# Patient Record
Sex: MALE | Race: WHITE | Employment: OTHER | URBAN - METROPOLITAN AREA
[De-identification: names, ages, dates, MRNs, and addresses within clinical notes are randomized per-mention and may not be internally consistent; named-entity substitution may affect disease eponyms.]

---

## 2017-02-07 ENCOUNTER — OFFICE VISIT (OUTPATIENT)
Dept: ORTHOPEDIC SURGERY | Age: 62
End: 2017-02-07

## 2017-02-07 VITALS
BODY MASS INDEX: 32.51 KG/M2 | OXYGEN SATURATION: 96 % | HEIGHT: 72 IN | HEART RATE: 80 BPM | DIASTOLIC BLOOD PRESSURE: 75 MMHG | RESPIRATION RATE: 18 BRPM | SYSTOLIC BLOOD PRESSURE: 111 MMHG | TEMPERATURE: 98.2 F | WEIGHT: 240 LBS

## 2017-02-07 DIAGNOSIS — M54.5 LOW BACK PAIN, UNSPECIFIED BACK PAIN LATERALITY, UNSPECIFIED CHRONICITY, WITH SCIATICA PRESENCE UNSPECIFIED: Primary | ICD-10-CM

## 2017-02-07 DIAGNOSIS — Z98.1 S/P LUMBAR FUSION: ICD-10-CM

## 2017-02-07 DIAGNOSIS — M53.3 PAIN OF BOTH SACROILIAC JOINTS: ICD-10-CM

## 2017-02-07 DIAGNOSIS — M47.816 LUMBAR FACET ARTHROPATHY: ICD-10-CM

## 2017-02-07 DIAGNOSIS — M62.830 MUSCLE SPASM OF BACK: ICD-10-CM

## 2017-02-07 RX ORDER — OXYCODONE AND ACETAMINOPHEN 10; 325 MG/1; MG/1
1 TABLET ORAL
COMMUNITY
Start: 2017-01-12 | End: 2020-11-04

## 2017-02-07 RX ORDER — FLUTICASONE PROPIONATE 50 MCG
SPRAY, SUSPENSION (ML) NASAL
Refills: 12 | COMMUNITY
Start: 2016-12-16

## 2017-02-07 RX ORDER — FLUOXETINE HYDROCHLORIDE 20 MG/1
20 CAPSULE ORAL DAILY
Refills: 3 | COMMUNITY
Start: 2017-01-19

## 2017-02-07 RX ORDER — OMEPRAZOLE 20 MG/1
20 CAPSULE, DELAYED RELEASE ORAL DAILY
Refills: 8 | COMMUNITY
Start: 2017-01-22

## 2017-02-07 RX ORDER — BUPROPION HYDROCHLORIDE 300 MG/1
300 TABLET ORAL
COMMUNITY
Start: 2017-01-18 | End: 2020-11-04

## 2017-02-07 RX ORDER — ERYTHROMYCIN 250 MG/1
CAPSULE, DELAYED RELEASE ORAL
Refills: 0 | COMMUNITY
Start: 2016-11-23 | End: 2017-02-07 | Stop reason: ALTCHOICE

## 2017-02-07 RX ORDER — BENZONATATE 100 MG/1
CAPSULE ORAL
COMMUNITY
Start: 2017-01-12 | End: 2017-02-07 | Stop reason: ALTCHOICE

## 2017-02-07 RX ORDER — AZELASTINE 1 MG/ML
2 SPRAY, METERED NASAL
Refills: 3 | COMMUNITY
Start: 2017-01-14

## 2017-02-07 RX ORDER — PRAZOSIN HYDROCHLORIDE 2 MG/1
CAPSULE ORAL
Refills: 11 | COMMUNITY
Start: 2017-01-19 | End: 2020-11-04

## 2017-02-07 RX ORDER — FLUNISOLIDE 0.25 MG/ML
SOLUTION NASAL
Refills: 3 | COMMUNITY
Start: 2017-01-14 | End: 2018-11-29 | Stop reason: ALTCHOICE

## 2017-02-07 RX ORDER — CHLORTHALIDONE 25 MG/1
TABLET ORAL
Refills: 2 | COMMUNITY
Start: 2017-01-06 | End: 2017-02-07 | Stop reason: ALTCHOICE

## 2017-02-07 NOTE — PATIENT INSTRUCTIONS
Low Back Arthritis: Exercises  Your Care Instructions  Here are some examples of typical rehabilitation exercises for your condition. Start each exercise slowly. Ease off the exercise if you start to have pain. Your doctor or physical therapist will tell you when you can start these exercises and which ones will work best for you. When you are not being active, find a comfortable position for rest. Some people are comfortable on the floor or a medium-firm bed with a small pillow under their head and another under their knees. Some people prefer to lie on their side with a pillow between their knees. Don't stay in one position for too long. Take short walks (10 to 20 minutes) every 2 to 3 hours. Avoid slopes, hills, and stairs until you feel better. Walk only distances you can manage without pain, especially leg pain. How to do the exercises  Pelvic tilt    1. Lie on your back with your knees bent. 2. \"Brace\" your stomach--tighten your muscles by pulling in and imagining your belly button moving toward your spine. 3. Press your lower back into the floor. You should feel your hips and pelvis rock back. 4. Hold for 6 seconds while breathing smoothly. 5. Relax and allow your pelvis and hips to rock forward. 6. Repeat 8 to 12 times. Back stretches    1. Get down on your hands and knees on the floor. 2. Relax your head and allow it to droop. Round your back up toward the ceiling until you feel a nice stretch in your upper, middle, and lower back. Hold this stretch for as long as it feels comfortable, or about 15 to 30 seconds. 3. Return to the starting position with a flat back while you are on your hands and knees. 4. Let your back sway by pressing your stomach toward the floor. Lift your buttocks toward the ceiling. 5. Hold this position for 15 to 30 seconds. 6. Repeat 2 to 4 times. Follow-up care is a key part of your treatment and safety.  Be sure to make and go to all appointments, and call your doctor if you are having problems. It's also a good idea to know your test results and keep a list of the medicines you take. Where can you learn more? Go to http://sue-gia.info/. Enter B055 in the search box to learn more about \"Low Back Arthritis: Exercises. \"  Current as of: May 23, 2016  Content Version: 11.1  © 5167-2878 [employer]. Care instructions adapted under license by [employer] (which disclaims liability or warranty for this information). If you have questions about a medical condition or this instruction, always ask your healthcare professional. [place] [address] any warranty or liability for your use of this information. Sacroiliac Pain: Exercises  Your Care Instructions  Here are some examples of typical rehabilitation exercises for your condition. Start each exercise slowly. Ease off the exercise if you start to have pain. Your doctor or physical therapist will tell you when you can start these exercises and which ones will work best for you. How to do the exercises  Knee-to-chest stretch    Do not do the knee-to-chest exercise if it causes or increases back or leg pain. 7. Lie on your back with your knees bent and your feet flat on the floor. You can put a small pillow under your head and neck if it is more comfortable. 8. Grasp your hands under one knee and bring the knee to your chest, keeping the other foot flat on the floor. 9. Keep your lower back pressed to the floor. Hold for at least 15 to 30 seconds. 10. Relax and lower the knee to the starting position. Repeat with the other leg. 11. Repeat 2 to 4 times with each leg. 12. To get more stretch, keep your other leg flat on the floor while pulling your knee to your chest.  Bridging    7. Lie on your back with both knees bent. Your knees should be bent about 90 degrees. 8. Tighten your belly muscles by pulling in your belly button toward your spine.  Then push your feet into the floor, squeeze your buttocks, and lift your hips off the floor until your shoulders, hips, and knees are all in a straight line. 9. Hold for about 6 seconds as you continue to breathe normally, and then slowly lower your hips back down to the floor and rest for up to 10 seconds. 10. Repeat 8 to 12 times. Hip extension    1. Get down on your hands and knees on the floor. 2. Keeping your back and neck straight, lift one leg straight out behind you. When you lift your leg, keep your hips level. Don't let your back twist, and don't let your hip drop toward the floor. 3. Hold for 6 seconds. Repeat 8 to 12 times with each leg. 4. If you feel steady and strong when you do this exercise, you can make it more difficult. To do this, when you lift your leg, also lift the opposite arm straight out in front of you. For example, lift the left leg and the right arm at the same time. (This is sometimes called the \"bird dog exercise. \") Hold for 6 seconds, and repeat 8 to 12 times on each side. Clamshell    1. Lie on your side with a pillow under your head. Keep your feet and knees together and your knees bent. 2. Raise your top knee, but keep your feet together. Do not let your hips roll back. Your legs should open up like a clamshell. 3. Hold for 6 seconds. 4. Slowly lower your knee back down. Rest for 10 seconds. 5. Repeat 8 to 12 times. 6. Switch to your other side and repeat steps 1 through 5. Hamstring wall stretch    1. Lie on your back in a doorway, with one leg through the open door. 2. Slide your affected leg up the wall to straighten your knee. You should feel a gentle stretch down the back of your leg. ¨ Do not arch your back. ¨ Do not bend either knee. ¨ Keep one heel touching the floor and the other heel touching the wall. Do not point your toes. 3. Hold the stretch for at least 1 minute to begin. Then try to lengthen the time you hold the stretch to as long as 6 minutes.   4. Switch legs, and repeat steps 1 through 3.  5. Repeat 2 to 4 times. If you do not have a place to do this exercise in a doorway, there is another way to do it:  1. Lie on your back, and bend one knee. 2. Loop a towel under the ball and toes of that foot, and hold the ends of the towel in your hands. 3. Straighten your knee, and slowly pull back on the towel. You should feel a gentle stretch down the back of your leg. 4. Switch legs, and repeat steps 1 through 3.  5. Repeat 2 to 4 times. Lower abdominal strengthening    1. Lie on your back with your knees bent and your feet flat on the floor. 2. Tighten your belly muscles by pulling your belly button in toward your spine. 3. Lift one foot off the floor and bring your knee toward your chest, so that your knee is straight above your hip and your leg is bent like the letter \"L. \"  4. Lift the other knee up to the same position. 5. Lower one leg at a time to the starting position. 6. Keep alternating legs until you have lifted each leg 8 to 12 times. 7. Be sure to keep your belly muscles tight and your back still as you are moving your legs. Be sure to breathe normally. Piriformis stretch    1. Lie on your back with your legs straight. 2. Lift your affected leg, and bend your knee. With your opposite hand, reach across your body, and then gently pull your knee toward your opposite shoulder. 3. Hold the stretch for 15 to 30 seconds. 4. Switch legs and repeat steps 1 through 3.  5. Repeat 2 to 4 times. Follow-up care is a key part of your treatment and safety. Be sure to make and go to all appointments, and call your doctor if you are having problems. It's also a good idea to know your test results and keep a list of the medicines you take. Where can you learn more? Go to http://sue-gia.info/. Enter W160 in the search box to learn more about \"Sacroiliac Pain: Exercises. \"  Current as of: May 23, 2016  Content Version: 11.1  © 9956-0499 [employer]. Care instructions adapted under license by [employer] (which disclaims liability or warranty for this information). If you have questions about a medical condition or this instruction, always ask your healthcare professional. [address] any warranty or liability for your use of this information.

## 2017-02-07 NOTE — MR AVS SNAPSHOT
Visit Information Date & Time Provider Department Dept. Phone Encounter #  
 2/7/2017  9:00 AM Meme Freed, [address] [facility] [phone] [id number] Follow-up Instructions Return if symptoms worsen or fail to improve. Upcoming Health Maintenance Date Due Hepatitis C Screening 1955 DTaP/Tdap/Td series (1 - Tdap) 3/1/1977 FOBT Q 1 YEAR AGE 50-75 3/1/2006 ZOSTER VACCINE AGE 60> 3/1/2016 INFLUENZA AGE 9 TO ADULT 8/1/2016 Allergies as of 2/7/2017  Never Reviewed Severity Noted Reaction Type Reactions Chlorthalidone  02/07/2017    Other (comments) Low Blood Pressure Effexor [Venlafaxine]  02/07/2017    Other (comments) High Blood Pressure Keflex [Cephalexin]  02/07/2017    Itching Current Immunizations  Never Reviewed No immunizations on file. Not reviewed this visit You Were Diagnosed With   
  
 Codes Comments Low back pain, unspecified back pain laterality, unspecified chronicity, with sciatica presence unspecified    -  Primary ICD-10-CM: M54.5 ICD-9-CM: 724.2 Lumbar facet arthropathy (HCC)     ICD-10-CM: M12.88 ICD-9-CM: 721.3 S/P lumbar fusion     ICD-10-CM: Z98.1 ICD-9-CM: V45.4 Muscle spasm of back     ICD-10-CM: F02.549 ICD-9-CM: 724.8 Pain of both sacroiliac joints     ICD-10-CM: M53.3 ICD-9-CM: 724.6 Vitals BP Pulse Temp Resp Height(growth percentile) Weight(growth percentile) 111/75 80 98.2 °F (36.8 °C) (Oral) 18 6' (1.829 m) 240 lb (108.9 kg) SpO2 BMI Smoking Status 96% 32.55 kg/m2 Former Smoker BMI and BSA Data Body Mass Index Body Surface Area 32.55 kg/m 2 2.35 m 2 Preferred Pharmacy Pharmacy Name Phone [facility] [address] [phone] Your Updated Medication List  
  
   
 This list is accurate as of: 2/7/17 10:00 AM.  Always use your most recent med list.  
  
  
  
  
 azelastine 137 mcg (0.1 %) nasal spray Commonly known as:  ASTELIN  
INT 2 SPRAYS IEN BID buPROPion  mg XL tablet Commonly known as:  Kehinde Grossman Take 300 mg by mouth every morning. flunisolide 25 mcg (0.025 %) Spry Commonly known as:  NASAREL  
INT 2 SPRAYS IEN BID FLUoxetine 20 mg capsule Commonly known as:  PROzac TK 1 C PO TID  
  
 fluticasone 50 mcg/actuation nasal spray Commonly known as:  Abhay Metro SHAKE LQ AND U 2 SPRAYS IEN QD  
  
 omeprazole 20 mg capsule Commonly known as:  PRILOSEC TK 1 C PO BID  
  
 oxyCODONE-acetaminophen  mg per tablet Commonly known as:  PERCOCET 10 Take 1 Tab by mouth every six (6) hours as needed. prazosin 2 mg capsule Commonly known as:  MINIPRESS TK 1 TO 2 CS PO QHS FOR TRAUMATIC DREAMS We Performed the Following AMB POC XRAY, SPINE, LUMBOSACRAL; 4+ [id number] CPT(R)] Follow-up Instructions Return if symptoms worsen or fail to improve. Patient Instructions Low Back Arthritis: Exercises Your Care Instructions Here are some examples of typical rehabilitation exercises for your condition. Start each exercise slowly. Ease off the exercise if you start to have pain. Your doctor or physical therapist will tell you when you can start these exercises and which ones will work best for you. When you are not being active, find a comfortable position for rest. Some people are comfortable on the floor or a medium-firm bed with a small pillow under their head and another under their knees. Some people prefer to lie on their side with a pillow between their knees. Don't stay in one position for too long. Take short walks (10 to 20 minutes) every 2 to 3 hours. Avoid slopes, hills, and stairs until you feel better. Walk only distances you can manage without pain, especially leg pain. How to do the exercises Pelvic tilt 1. Lie on your back with your knees bent. 2. \"Brace\" your stomachtighten your muscles by pulling in and imagining your belly button moving toward your spine. 3. Press your lower back into the floor. You should feel your hips and pelvis rock back. 4. Hold for 6 seconds while breathing smoothly. 5. Relax and allow your pelvis and hips to rock forward. 6. Repeat 8 to 12 times. Back stretches 1. Get down on your hands and knees on the floor. 2. Relax your head and allow it to droop. Round your back up toward the ceiling until you feel a nice stretch in your upper, middle, and lower back. Hold this stretch for as long as it feels comfortable, or about 15 to 30 seconds. 3. Return to the starting position with a flat back while you are on your hands and knees. 4. Let your back sway by pressing your stomach toward the floor. Lift your buttocks toward the ceiling. 5. Hold this position for 15 to 30 seconds. 6. Repeat 2 to 4 times. Follow-up care is a key part of your treatment and safety. Be sure to make and go to all appointments, and call your doctor if you are having problems. It's also a good idea to know your test results and keep a list of the medicines you take. Where can you learn more? Go to http://sue-gia.info/. Enter T251 in the search box to learn more about \"Low Back Arthritis: Exercises. \" Current as of: May 23, 2016 Content Version: 11.1 © 20060739-8517 [employer]. Care instructions adapted under license by [employer] (which disclaims liability or warranty for this information). If you have questions about a medical condition or this instruction, always ask your healthcare professional. [address] any warranty or liability for your use of this information. Sacroiliac Pain: Exercises Your Care Instructions Here are some examples of typical rehabilitation exercises for your condition. Start each exercise slowly. Ease off the exercise if you start to have pain. Your doctor or physical therapist will tell you when you can start these exercises and which ones will work best for you. How to do the exercises Knee-to-chest stretch Do not do the knee-to-chest exercise if it causes or increases back or leg pain. 7. Lie on your back with your knees bent and your feet flat on the floor. You can put a small pillow under your head and neck if it is more comfortable. 8. Grasp your hands under one knee and bring the knee to your chest, keeping the other foot flat on the floor. 9. Keep your lower back pressed to the floor. Hold for at least 15 to 30 seconds. 10. Relax and lower the knee to the starting position. Repeat with the other leg. 11. Repeat 2 to 4 times with each leg. 12. To get more stretch, keep your other leg flat on the floor while pulling your knee to your chest. 
Bridging 7. Lie on your back with both knees bent. Your knees should be bent about 90 degrees. 8. Tighten your belly muscles by pulling in your belly button toward your spine. Then push your feet into the floor, squeeze your buttocks, and lift your hips off the floor until your shoulders, hips, and knees are all in a straight line. 9. Hold for about 6 seconds as you continue to breathe normally, and then slowly lower your hips back down to the floor and rest for up to 10 seconds. 10. Repeat 8 to 12 times. Hip extension 1. Get down on your hands and knees on the floor. 2. Keeping your back and neck straight, lift one leg straight out behind you. When you lift your leg, keep your hips level. Don't let your back twist, and don't let your hip drop toward the floor. 3. Hold for 6 seconds. Repeat 8 to 12 times with each leg. 4. If you feel steady and strong when you do this exercise, you can make it more difficult.  To do this, when you lift your leg, also lift the opposite arm straight out in front of you. For example, lift the left leg and the right arm at the same time. (This is sometimes called the \"bird dog exercise. \") Hold for 6 seconds, and repeat 8 to 12 times on each side. Clamshell 1. Lie on your side with a pillow under your head. Keep your feet and knees together and your knees bent. 2. Raise your top knee, but keep your feet together. Do not let your hips roll back. Your legs should open up like a clamshell. 3. Hold for 6 seconds. 4. Slowly lower your knee back down. Rest for 10 seconds. 5. Repeat 8 to 12 times. 6. Switch to your other side and repeat steps 1 through 5. Hamstring wall stretch 1. Lie on your back in a doorway, with one leg through the open door. 2. Slide your affected leg up the wall to straighten your knee. You should feel a gentle stretch down the back of your leg. ¨ Do not arch your back. ¨ Do not bend either knee. ¨ Keep one heel touching the floor and the other heel touching the wall. Do not point your toes. 3. Hold the stretch for at least 1 minute to begin. Then try to lengthen the time you hold the stretch to as long as 6 minutes. 4. Switch legs, and repeat steps 1 through 3. 
5. Repeat 2 to 4 times. If you do not have a place to do this exercise in a doorway, there is another way to do it: 1. Lie on your back, and bend one knee. 2. Loop a towel under the ball and toes of that foot, and hold the ends of the towel in your hands. 3. Straighten your knee, and slowly pull back on the towel. You should feel a gentle stretch down the back of your leg. 4. Switch legs, and repeat steps 1 through 3. 
5. Repeat 2 to 4 times. Lower abdominal strengthening 1. Lie on your back with your knees bent and your feet flat on the floor. 2. Tighten your belly muscles by pulling your belly button in toward your spine.  
3. Lift one foot off the floor and bring your knee toward your chest, so that your knee is straight above your hip and your leg is bent like the letter \"L. \" 
4. Lift the other knee up to the same position. 5. Lower one leg at a time to the starting position. 6. Keep alternating legs until you have lifted each leg 8 to 12 times. 7. Be sure to keep your belly muscles tight and your back still as you are moving your legs. Be sure to breathe normally. Piriformis stretch 1. Lie on your back with your legs straight. 2. Lift your affected leg, and bend your knee. With your opposite hand, reach across your body, and then gently pull your knee toward your opposite shoulder. 3. Hold the stretch for 15 to 30 seconds. 4. Switch legs and repeat steps 1 through 3. 
5. Repeat 2 to 4 times. Follow-up care is a key part of your treatment and safety. Be sure to make and go to all appointments, and call your doctor if you are having problems. It's also a good idea to know your test results and keep a list of the medicines you take. Where can you learn more? Go to http://sue-gia.info/. Enter N161 in the search box to learn more about \"Sacroiliac Pain: Exercises. \" Current as of: May 23, 2016 Content Version: 11.1 © 2621-1112 [employer]. Care instructions adapted under license by [employer] (which disclaims liability or warranty for this information). If you have questions about a medical condition or this instruction, always ask your healthcare professional. [place] [address] any warranty or liability for your use of this information. Introducing [facility]! [facility] introduces [employer] patient portal. Now you can access parts of your medical record, email your doctor's office, and request medication refills online. 1. In your internet browser, go to [email or web address] 2. Click on the First Time User? Click Here link in the Sign In box. You will see the New Member Sign Up page. 3. Enter your Dialective Access Code exactly as it appears below. You will not need to use this code after youve completed the sign-up process. If you do not sign up before the expiration date, you must request a new code. · Dialective Access Code: [email or web address] Expires: 5/8/2017 10:00 AM 
 
4. Enter the last four digits of your Social Security Number (xxxx) and Date of Birth (mm/dd/yyyy) as indicated and click Submit. You will be taken to the next sign-up page. 5. Create a Dialective ID. This will be your Dialective login ID and cannot be changed, so think of one that is secure and easy to remember. 6. Create a Dialective password. You can change your password at any time. 7. Enter your Password Reset Question and Answer. This can be used at a later time if you forget your password. 8. Enter your e-mail address. You will receive e-mail notification when new information is available in [address]. 9. Click Sign Up. You can now view and download portions of your medical record. 10. Click the Download Summary menu link to download a portable copy of your medical information. If you have questions, please visit the Frequently Asked Questions section of the Dialective website. Remember, Dialective is NOT to be used for urgent needs. For medical emergencies, dial 911. Now available from your iPhone and Android! Please provide this summary of care documentation to your next provider. Your primary care clinician is listed as [address]. If you have any questions after today's visit, please call [phone].

## 2017-02-07 NOTE — PROGRESS NOTES
[facility]  [address] [place], [facility] [address]  Phone: [phone]  Fax: [phone]          HISTORY OF PRESENT ILLNESS:  Jazmin Christian is a 62 y.o. male with history of lumbar pain x 30 years. He c/o constant pain in the lower back but denies pain radiating down the legs. Pt denies experiencing weakness or numbness in the legs and denies any loss of bladder or bladder control. 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 Pt denies any pain when turing in bed but reports that he tosses and turns all night due to difficulty finding a comfortable positions. He retired due to medical reasons and after retiring he had multiple surgeries for bilateral heel spurs, cataracts, and a deviated septum. Pt had prior lumbar surgery with Dr. Red Alston and reports temporary improvement with the surgery. He has been receiving chiropractic care every week and was recommended by his chiropractor to use an inversion table. Pt states that he experienced relief in his lower back pain the first time he tried an Iron Man inversion table about 1 week ago. He admits that he has been sleeping better since trying the inversion table. Pt denies any history of diabetes or hypertension. Of note, He used to smoke and quit 17 years ago. Pt states that he uses Percocet daily and also takes ibuprofen. When his pain is severe he takes Flexeril with benefit. Pt at this time desires to proceed with information on home SI exercises. Pain Scale: 0 - No pain/10     PCP: Kami Kaplan MD      History reviewed. No pertinent past medical history. Social History     Social History    Marital status:      Spouse name: N/A    Number of children: N/A    Years of education: N/A     Occupational History    Not on file.      Social History Main Topics    Smoking status: Former Smoker     Packs/day: 1.00     Years: 15.00     Quit date: 1/1/2000    Smokeless tobacco: Never Used    Alcohol use No    Drug use: No    Sexual activity: Not Currently     Other Topics Concern    Not on file     Social History Narrative    No narrative on file           Allergies   Allergen Reactions    Chlorthalidone Other (comments)     Low Blood Pressure    Effexor [Venlafaxine] Other (comments)     High Blood Pressure      Keflex [Cephalexin] Itching       REVIEW OF SYSTEMS    Constitutional: Negative for fever, chills, or weight change. Respiratory: Negative for cough or shortness of breath. Cardiovascular: Negative for chest pain or palpitations. Gastrointestinal: Negative for acid reflux, change in bowel habits, or constipation. Genitourinary: Negative for dysuria and flank pain. Musculoskeletal: Positive for lumbar pain. Skin: Negative for rash. Neurological: Negative for headaches, dizziness, or numbness. Endo/Heme/Allergies: Negative for increased bruising. Psychiatric/Behavioral: Negative for difficulty with sleep. PHYSICAL EXAMINATION  Visit Vitals    /75    Pulse 80    Temp 98.2 °F (36.8 °C) (Oral)    Resp 18    Ht 6' (1.829 m)    Wt 240 lb (108.9 kg)    SpO2 96%    BMI 32.55 kg/m2       Constitutional: Awake, alert, and in no acute distress  HEENT: Normocephalic. Atraumatic. Oropharynx is moist and clear. PERRL. EOMI. Sclerae are nonicteric  Heart: Regular rate and rhythm  Lungs: Clear to auscultation bilaterally  Abdomen: Soft and nontender. Bowel sounds are present  Neurological: 1+ symmetrical DTRs in the upper extremities. 1+ symmetrical DTRs in the lower extremities. Sensation to light touch is intact. Negative Mark's sign bilaterally. Skin: warm, dry, and intact. Musculoskeletal: Excellent ROM in the cervical spine on all planes. No tightness or pain with palpation across the upper trapezius. Tenderness to palpation in the lower lumbar region and over the SI joints.  Moderate pain with extension, axial loading, and forward flexion. No pain with internal or external rotation of his hips. Negative JATINDER test bilaterally. Negative straight leg raise bilaterally. No pain with heel or toe walking. No difficulty with the single leg stand bilaterally. Biceps  Triceps Deltoids Wrist Ext Wrist Flex Hand Intrin   Right +4/5 +4/5 +4/5 +4/5 +4/5 +4/5   Left +4/5 +4/5 +4/5 +4/5 +4/5 +4/5      Hip Flex  Quads Hamstrings Ankle DF EHL Ankle PF   Right +4/5 +4/5 +4/5 +4/5 +4/5 +4/5   Left +4/5 +4/5 +4/5 +4/5 +4/5 +4/5     IMAGING:    Lumbar 4V X-rays from 02/07/2017 were personally reviewed with the Pt and demonstrated:  1) Mild scoliosis. 2) L5-S1 fusion. 3) Degenerative facet at L3 and L4.     ASSESSMENT   Regina Santana was seen today for back pain. Diagnoses and all orders for this visit:    Low back pain, unspecified back pain laterality, unspecified chronicity, with sciatica presence unspecified  -     [63501] LS Spine 4V    Lumbar facet arthropathy (HCC)    S/P lumbar fusion    Muscle spasm of back    Pain of both sacroiliac joints       IMPRESSION AND PLAN:  Lavonne Rodriguez is a 62 y.o. male with history of lumbar pain x 30 years. He c/o constant pain in the lower back but denies pain radiating down the legs. 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 He had prior lumbar surgery with Dr. Billy Ramos and reports temporary improvement with the surgery. Pt states that he experienced relief in his lower back pain the first time he tried an inversion table about 1 week ago. He denies any history of diabetes or hypertension. Of note, He used to smoke and quit 17 years ago. 1) Pt was given information on SI exercises. 2) Discussed treatment options with the Pt, including steroid injections and a RFA. 3) He will continue using his inversion table. 4) I advised the Pt against receiving high velocity chiropractic care.    5) I recommended the Pt try beginner's yoga and water exercise. 6) I informed the Pt of proper lifting mechanics. 7) Mr. Edgar Munguia has a reminder for a \"due or due soon\" health maintenance. I have asked that he contact his primary care provider, Mario Alberto Engel MD, for follow-up on this health maintenance. 8)  reviewed. 9) Pt will follow-up as needed.         Written by Isaac Del Rosario, as dictated by Lisset Gayle MD.

## 2017-10-26 ENCOUNTER — OFFICE VISIT (OUTPATIENT)
Dept: ORTHOPEDIC SURGERY | Age: 62
End: 2017-10-26

## 2017-10-26 VITALS
TEMPERATURE: 97.6 F | RESPIRATION RATE: 16 BRPM | SYSTOLIC BLOOD PRESSURE: 147 MMHG | DIASTOLIC BLOOD PRESSURE: 93 MMHG | HEIGHT: 72 IN | WEIGHT: 237 LBS | HEART RATE: 76 BPM | BODY MASS INDEX: 32.1 KG/M2

## 2017-10-26 DIAGNOSIS — M62.830 MUSCLE SPASM OF BACK: ICD-10-CM

## 2017-10-26 DIAGNOSIS — M53.3 PAIN OF BOTH SACROILIAC JOINTS: ICD-10-CM

## 2017-10-26 DIAGNOSIS — M47.816 LUMBAR FACET ARTHROPATHY: Primary | ICD-10-CM

## 2017-10-26 DIAGNOSIS — Z98.1 S/P LUMBAR FUSION: ICD-10-CM

## 2017-10-26 RX ORDER — METHYLPREDNISOLONE 4 MG/1
TABLET ORAL
Qty: 1 DOSE PACK | Refills: 0 | Status: SHIPPED | OUTPATIENT
Start: 2017-10-26 | End: 2018-11-29 | Stop reason: ALTCHOICE

## 2017-10-26 RX ORDER — CYCLOBENZAPRINE HCL 10 MG
TABLET ORAL
COMMUNITY

## 2017-10-26 NOTE — PATIENT INSTRUCTIONS
Sacroiliac Pain: Exercises  Your Care Instructions  Here are some examples of typical rehabilitation exercises for your condition. Start each exercise slowly. Ease off the exercise if you start to have pain. Your doctor or physical therapist will tell you when you can start these exercises and which ones will work best for you. How to do the exercises  Knee-to-chest stretch    1. Do not do the knee-to-chest exercise if it causes or increases back or leg pain. 2. Lie on your back with your knees bent and your feet flat on the floor. You can put a small pillow under your head and neck if it is more comfortable. 3. Grasp your hands under one knee and bring the knee to your chest, keeping the other foot flat on the floor. 4. Keep your lower back pressed to the floor. Hold for at least 15 to 30 seconds. 5. Relax and lower the knee to the starting position. Repeat with the other leg. 6. Repeat 2 to 4 times with each leg. 7. To get more stretch, keep your other leg flat on the floor while pulling your knee to your chest.  Bridging    1. Lie on your back with both knees bent. Your knees should be bent about 90 degrees. 2. Tighten your belly muscles by pulling in your belly button toward your spine. Then push your feet into the floor, squeeze your buttocks, and lift your hips off the floor until your shoulders, hips, and knees are all in a straight line. 3. Hold for about 6 seconds as you continue to breathe normally, and then slowly lower your hips back down to the floor and rest for up to 10 seconds. 4. Repeat 8 to 12 times. Hip extension    1. Get down on your hands and knees on the floor. 2. Keeping your back and neck straight, lift one leg straight out behind you. When you lift your leg, keep your hips level. Don't let your back twist, and don't let your hip drop toward the floor. 3. Hold for 6 seconds. Repeat 8 to 12 times with each leg.   4. If you feel steady and strong when you do this exercise, you can make it more difficult. To do this, when you lift your leg, also lift the opposite arm straight out in front of you. For example, lift the left leg and the right arm at the same time. (This is sometimes called the \"bird dog exercise. \") Hold for 6 seconds, and repeat 8 to 12 times on each side. Clamshell    1. Lie on your side with a pillow under your head. Keep your feet and knees together and your knees bent. 2. Raise your top knee, but keep your feet together. Do not let your hips roll back. Your legs should open up like a clamshell. 3. Hold for 6 seconds. 4. Slowly lower your knee back down. Rest for 10 seconds. 5. Repeat 8 to 12 times. 6. Switch to your other side and repeat steps 1 through 5. Hamstring wall stretch    1. Lie on your back in a doorway, with one leg through the open door. 2. Slide your affected leg up the wall to straighten your knee. You should feel a gentle stretch down the back of your leg. 1. Do not arch your back. 2. Do not bend either knee. 3. Keep one heel touching the floor and the other heel touching the wall. Do not point your toes. 3. Hold the stretch for at least 1 minute to begin. Then try to lengthen the time you hold the stretch to as long as 6 minutes. 4. Switch legs, and repeat steps 1 through 3.  5. Repeat 2 to 4 times. 6. If you do not have a place to do this exercise in a doorway, there is another way to do it:  7. Lie on your back, and bend one knee. 8. Loop a towel under the ball and toes of that foot, and hold the ends of the towel in your hands. 9. Straighten your knee, and slowly pull back on the towel. You should feel a gentle stretch down the back of your leg. 10. Switch legs, and repeat steps 1 through 3.  11. Repeat 2 to 4 times. Lower abdominal strengthening    1. Lie on your back with your knees bent and your feet flat on the floor. 2. Tighten your belly muscles by pulling your belly button in toward your spine.   3. Lift one foot off the floor and bring your knee toward your chest, so that your knee is straight above your hip and your leg is bent like the letter \"L. \"  4. Lift the other knee up to the same position. 5. Lower one leg at a time to the starting position. 6. Keep alternating legs until you have lifted each leg 8 to 12 times. 7. Be sure to keep your belly muscles tight and your back still as you are moving your legs. Be sure to breathe normally. Piriformis stretch    1. Lie on your back with your legs straight. 2. Lift your affected leg, and bend your knee. With your opposite hand, reach across your body, and then gently pull your knee toward your opposite shoulder. 3. Hold the stretch for 15 to 30 seconds. 4. Switch legs and repeat steps 1 through 3.  5. Repeat 2 to 4 times. Follow-up care is a key part of your treatment and safety. Be sure to make and go to all appointments, and call your doctor if you are having problems. It's also a good idea to know your test results and keep a list of the medicines you take. Where can you learn more? Go to http://sue-gia.info/. Enter P358 in the search box to learn more about \"Sacroiliac Pain: Exercises. \"  Current as of: March 21, 2017  Content Version: 11.4  © 7639-3482 [employer]. Care instructions adapted under license by [employer] (which disclaims liability or warranty for this information). If you have questions about a medical condition or this instruction, always ask your healthcare professional. [address] any warranty or liability for your use of this information. Low Back Arthritis: Exercises  Your Care Instructions  Here are some examples of typical rehabilitation exercises for your condition. Start each exercise slowly. Ease off the exercise if you start to have pain. Your doctor or physical therapist will tell you when you can start these exercises and which ones will work best for you.   When you are not being active, find a comfortable position for rest. Some people are comfortable on the floor or a medium-firm bed with a small pillow under their head and another under their knees. Some people prefer to lie on their side with a pillow between their knees. Don't stay in one position for too long. Take short walks (10 to 20 minutes) every 2 to 3 hours. Avoid slopes, hills, and stairs until you feel better. Walk only distances you can manage without pain, especially leg pain. How to do the exercises  Pelvic tilt    8. Lie on your back with your knees bent. 9. \"Brace\" your stomach-tighten your muscles by pulling in and imagining your belly button moving toward your spine. 10. Press your lower back into the floor. You should feel your hips and pelvis rock back. 11. Hold for 6 seconds while breathing smoothly. 12. Relax and allow your pelvis and hips to rock forward. 13. Repeat 8 to 12 times. Back stretches    5. Get down on your hands and knees on the floor. 6. Relax your head and allow it to droop. Round your back up toward the ceiling until you feel a nice stretch in your upper, middle, and lower back. Hold this stretch for as long as it feels comfortable, or about 15 to 30 seconds. 7. Return to the starting position with a flat back while you are on your hands and knees. 8. Let your back sway by pressing your stomach toward the floor. Lift your buttocks toward the ceiling. 9. Hold this position for 15 to 30 seconds. 10. Repeat 2 to 4 times. Follow-up care is a key part of your treatment and safety. Be sure to make and go to all appointments, and call your doctor if you are having problems. It's also a good idea to know your test results and keep a list of the medicines you take. Where can you learn more? Go to http://sue-gia.info/. Enter C815 in the search box to learn more about \"Low Back Arthritis: Exercises. \"  Current as of: March 21, 2017  Content Version: 11.4  © 9124-4861 [employer]. Care instructions adapted under license by [employer] (which disclaims liability or warranty for this information). If you have questions about a medical condition or this instruction, always ask your healthcare professional. [address] any warranty or liability for your use of this information.

## 2017-10-26 NOTE — PROGRESS NOTES
[facility]  [address] [place], [facility] [address]  Phone: [phone]  Fax: [phone]      ASSESSMENT   Diagnoses and all orders for this visit:    1. Lumbar facet arthropathy  -     methylPREDNISolone (MEDROL DOSEPACK) 4 mg tablet; Per dose pack instructions    2. Pain of both sacroiliac joints  -     methylPREDNISolone (MEDROL DOSEPACK) 4 mg tablet; Per dose pack instructions    3. Muscle spasm of back    4. S/P lumbar fusion         IMPRESSION AND PLAN:  Kunal Julio is a 65 y.o. male with history of chronic lumbar pain. He complains of pain in the lower back that occasionally radiates down the posterior aspect of the left thigh when his pain is severe. Pt experienced an exacerbation of pain since his last office visit lasting about 2 months. He has been using Flexeril and ibuprofen as needed with relief. 1) Pt was given information on lumbar arthritis and sacroiliac exercises. 2) I recommended the patient try yoga. 3) He may intermittent wear a lumbar support as needed. 4) Pt was prescribed a Medrol Dosepak. 5) Discussed trying steroid injections if needed. 6) Mr. Dayday Walters has a reminder for a \"due or due soon\" health maintenance. I have asked that he contact his primary care provider, Neto Perez MD, for follow-up on this health maintenance. 7)  demonstrated consistency with prescribing. 8) Pt will follow-up in 2-3 months or as needed. HISTORY OF PRESENT ILLNESS:  Kunal Julio is a 65 y.o. male with history of chronic lumbar pain. He complains of pain in the lower back that occasionally radiates down the posterior aspect of the left thigh when his pain is severe. Of note, patient had two prior surgeries and a L4-S1. His first surgery was in 2002 and he had another surgery 6 months later to address leaking CSF fluid.  Pt reports that he woke up experiencing severe lower back pain a few months ago lasting about 2 months. He admits that his pain gradually improved on its own over time. 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 His pain is most severe when standing, standing, or lying down for prolonged periods of time, and improves when he changes positions. Pt admits to difficulty finding a comfortable position in bed. Pt has been using Flexeril and ibuprofen as needed with relief. He reports that he stretches and exercises regularly. Pt at this time desires to continue with current care. Pain Scale: 0 - No pain/10    PCP: Jose Armando Lucas MD       History reviewed. No pertinent past medical history. Social History     Social History    Marital status:      Spouse name: N/A    Number of children: N/A    Years of education: N/A     Occupational History    Not on file. Social History Main Topics    Smoking status: Former Smoker     Packs/day: 1.00     Years: 15.00     Quit date: 1/1/2000    Smokeless tobacco: Never Used    Alcohol use No    Drug use: No    Sexual activity: Not Currently     Other Topics Concern    Not on file     Social History Narrative       Current Outpatient Prescriptions   Medication Sig Dispense Refill    cyclobenzaprine (FLEXERIL) 10 mg tablet Take  by mouth three (3) times daily as needed for Muscle Spasm(s).  methylPREDNISolone (MEDROL DOSEPACK) 4 mg tablet Per dose pack instructions 1 Dose Pack 0    azelastine (ASTELIN) 137 mcg (0.1 %) nasal spray INT 2 SPRAYS IEN BID  3    buPROPion XL (WELLBUTRIN XL) 300 mg XL tablet Take 300 mg by mouth every morning.       FLUoxetine (PROZAC) 20 mg capsule TK 1 C PO TID  3    prazosin (MINIPRESS) 2 mg capsule TK 1 TO 2 CS PO QHS FOR TRAUMATIC DREAMS  11    omeprazole (PRILOSEC) 20 mg capsule TK 1 C PO BID  8    flunisolide (NASAREL) 25 mcg (0.025 %) spry INT 2 SPRAYS IEN BID  3    oxyCODONE-acetaminophen (PERCOCET 10)  mg per tablet Take 1 Tab by mouth every six (6) hours as needed.  fluticasone (FLONASE) 50 mcg/actuation nasal spray SHAKE LQ AND U 2 SPRAYS IEN QD  12       Allergies   Allergen Reactions    Chlorthalidone Other (comments)     Low Blood Pressure    Effexor [Venlafaxine] Other (comments)     High Blood Pressure      Keflex [Cephalexin] Itching         REVIEW OF SYSTEMS    Constitutional: Negative for fever, chills, or weight change. Respiratory: Negative for cough or shortness of breath. Cardiovascular: Negative for chest pain or palpitations. Gastrointestinal: Negative for acid reflux, change in bowel habits, or constipation. Genitourinary: Negative for dysuria and flank pain. Musculoskeletal: Positive for lumbar pain. Skin: Negative for rash. Neurological: Negative for headaches, dizziness, or numbness. Endo/Heme/Allergies: Negative for increased bruising. Psychiatric/Behavioral: Negative for difficulty with sleep. PHYSICAL EXAMINATION  Visit Vitals    BP (!) 147/93    Pulse 76    Temp 97.6 °F (36.4 °C) (Oral)    Resp 16    Ht 6' (1.829 m)    Wt 237 lb (107.5 kg)    BMI 32.14 kg/m2       Constitutional: Awake, alert, and in no acute distress. Neurological: 1+ symmetrical DTRs in the lower extremities. Sensation to light touch is intact. Skin: warm, dry, and intact. Musculoskeletal: Minimal tenderness to palpation in the lower lumbar region. Pain over the SI joints bilaterally. No pain with extension or axial loading. Pain with forward flexion. No pain with internal or external rotation of his hips. Negative straight leg raise bilaterally. Hip Flex  Quads Hamstrings Ankle DF EHL Ankle PF   Right +4/5 +4/5 +4/5 +4/5 +4/5 +4/5   Left +4/5 +4/5 +4/5 +4/5 +4/5 +4/5     IMAGING:    Lumbar 4V X-rays from 02/07/2017 were personally reviewed with the patient and demonstrated:  1) Mild scoliosis. 2) L5-S1 fusion. 3) Degenerative facet at L3 and L4.        Written by Bashir Joyce, as dictated by Kristina Madrid MD.  I, Dr. Kristina Madrid confirm that all documentation is accurate.

## 2017-10-26 NOTE — MR AVS SNAPSHOT
Visit Information Date & Time Provider Department Dept. Phone Encounter #  
 10/26/2017  2:15 PM Sosa Mckeon MD [address] [facility] [place] [phone] [id number] Follow-up Instructions Return if symptoms worsen or fail to improve. Upcoming Health Maintenance Date Due Hepatitis C Screening 1955 DTaP/Tdap/Td series (1 - Tdap) 3/1/1977 FOBT Q 1 YEAR AGE 50-75 3/1/2006 ZOSTER VACCINE AGE 60> 1/1/2016 INFLUENZA AGE 9 TO ADULT 8/1/2017 Allergies as of 10/26/2017  Review Complete On: 10/26/2017 By: Sosa Mckeon MD  
  
 Severity Noted Reaction Type Reactions Chlorthalidone  02/07/2017    Other (comments) Low Blood Pressure Effexor [Venlafaxine]  02/07/2017    Other (comments) High Blood Pressure Keflex [Cephalexin]  02/07/2017    Itching Current Immunizations  Never Reviewed No immunizations on file. Not reviewed this visit You Were Diagnosed With   
  
 Codes Comments Lumbar facet arthropathy    -  Primary ICD-10-CM: M12.88 ICD-9-CM: 721.3 Pain of both sacroiliac joints     ICD-10-CM: M53.3 ICD-9-CM: 724.6 S/P lumbar fusion     ICD-10-CM: Z98.1 ICD-9-CM: V45.4 Muscle spasm of back     ICD-10-CM: O02.995 ICD-9-CM: 724.8 Vitals BP Pulse Temp Resp Height(growth percentile) Weight(growth percentile) (!) 147/93 76 97.6 °F (36.4 °C) (Oral) 16 6' (1.829 m) 237 lb (107.5 kg) BMI Smoking Status 32.14 kg/m2 Former Smoker BMI and BSA Data Body Mass Index Body Surface Area  
 32.14 kg/m 2 2.34 m 2 Preferred Pharmacy Pharmacy Name Phone [facility] [address] [place], [place] [address] [address] [place] [address] [phone] Your Updated Medication List  
  
   
This list is accurate as of: 10/26/17  3:31 PM.  Always use your most recent med list.  
  
  
  
  
 azelastine 137 mcg (0.1 %) nasal spray Commonly known as:  ASTELIN  
INT 2 SPRAYS IEN BID buPROPion  mg XL tablet Commonly known as:  Genene Osmany Take 300 mg by mouth every morning. cyclobenzaprine 10 mg tablet Commonly known as:  FLEXERIL Take  by mouth three (3) times daily as needed for Muscle Spasm(s). flunisolide 25 mcg (0.025 %) Spry Commonly known as:  NASAREL  
INT 2 SPRAYS IEN BID FLUoxetine 20 mg capsule Commonly known as:  PROzac TK 1 C PO TID  
  
 fluticasone 50 mcg/actuation nasal spray Commonly known as:  Nohemi Fryer SHAKE LQ AND U 2 SPRAYS IEN QD  
  
 methylPREDNISolone 4 mg tablet Commonly known as:  Anitha Blackman Per dose pack instructions  
  
 omeprazole 20 mg capsule Commonly known as:  PRILOSEC TK 1 C PO BID  
  
 oxyCODONE-acetaminophen  mg per tablet Commonly known as:  PERCOCET 10 Take 1 Tab by mouth every six (6) hours as needed. prazosin 2 mg capsule Commonly known as:  MINIPRESS TK 1 TO 2 CS PO QHS FOR TRAUMATIC DREAMS Prescriptions Sent to Pharmacy Refills  
 methylPREDNISolone (MEDROL DOSEPACK) 4 mg tablet 0 Sig: Per dose pack instructions Class: Normal  
 Pharmacy: [facility] [address] [place] [address] [facility] [address] [place] [address] #: [phone] Follow-up Instructions Return if symptoms worsen or fail to improve. Patient Instructions Sacroiliac Pain: Exercises Your Care Instructions Here are some examples of typical rehabilitation exercises for your condition. Start each exercise slowly. Ease off the exercise if you start to have pain. Your doctor or physical therapist will tell you when you can start these exercises and which ones will work best for you. How to do the exercises Knee-to-chest stretch 1. Do not do the knee-to-chest exercise if it causes or increases back or leg pain. 2. Lie on your back with your knees bent and your feet flat on the floor. You can put a small pillow under your head and neck if it is more comfortable. 3. Grasp your hands under one knee and bring the knee to your chest, keeping the other foot flat on the floor. 4. Keep your lower back pressed to the floor. Hold for at least 15 to 30 seconds. 5. Relax and lower the knee to the starting position. Repeat with the other leg. 6. Repeat 2 to 4 times with each leg. 7. To get more stretch, keep your other leg flat on the floor while pulling your knee to your chest. 
Bridging 1. Lie on your back with both knees bent. Your knees should be bent about 90 degrees. 2. Tighten your belly muscles by pulling in your belly button toward your spine. Then push your feet into the floor, squeeze your buttocks, and lift your hips off the floor until your shoulders, hips, and knees are all in a straight line. 3. Hold for about 6 seconds as you continue to breathe normally, and then slowly lower your hips back down to the floor and rest for up to 10 seconds. 4. Repeat 8 to 12 times. Hip extension 1. Get down on your hands and knees on the floor. 2. Keeping your back and neck straight, lift one leg straight out behind you. When you lift your leg, keep your hips level. Don't let your back twist, and don't let your hip drop toward the floor. 3. Hold for 6 seconds. Repeat 8 to 12 times with each leg. 4. If you feel steady and strong when you do this exercise, you can make it more difficult. To do this, when you lift your leg, also lift the opposite arm straight out in front of you. For example, lift the left leg and the right arm at the same time. (This is sometimes called the \"bird dog exercise. \") Hold for 6 seconds, and repeat 8 to 12 times on each side. Clamshell 1. Lie on your side with a pillow under your head. Keep your feet and knees together and your knees bent. 2. Raise your top knee, but keep your feet together.  Do not let your hips roll back. Your legs should open up like a clamshell. 3. Hold for 6 seconds. 4. Slowly lower your knee back down. Rest for 10 seconds. 5. Repeat 8 to 12 times. 6. Switch to your other side and repeat steps 1 through 5. Hamstring wall stretch 1. Lie on your back in a doorway, with one leg through the open door. 2. Slide your affected leg up the wall to straighten your knee. You should feel a gentle stretch down the back of your leg. 1. Do not arch your back. 2. Do not bend either knee. 3. Keep one heel touching the floor and the other heel touching the wall. Do not point your toes. 3. Hold the stretch for at least 1 minute to begin. Then try to lengthen the time you hold the stretch to as long as 6 minutes. 4. Switch legs, and repeat steps 1 through 3. 
5. Repeat 2 to 4 times. 6. If you do not have a place to do this exercise in a doorway, there is another way to do it: 
7. Lie on your back, and bend one knee. 8. Loop a towel under the ball and toes of that foot, and hold the ends of the towel in your hands. 9. Straighten your knee, and slowly pull back on the towel. You should feel a gentle stretch down the back of your leg. 10. Switch legs, and repeat steps 1 through 3. 
11. Repeat 2 to 4 times. Lower abdominal strengthening 1. Lie on your back with your knees bent and your feet flat on the floor. 2. Tighten your belly muscles by pulling your belly button in toward your spine. 3. Lift one foot off the floor and bring your knee toward your chest, so that your knee is straight above your hip and your leg is bent like the letter \"L. \" 
4. Lift the other knee up to the same position. 5. Lower one leg at a time to the starting position. 6. Keep alternating legs until you have lifted each leg 8 to 12 times. 7. Be sure to keep your belly muscles tight and your back still as you are moving your legs. Be sure to breathe normally. Piriformis stretch 1. Lie on your back with your legs straight. 2. Lift your affected leg, and bend your knee. With your opposite hand, reach across your body, and then gently pull your knee toward your opposite shoulder. 3. Hold the stretch for 15 to 30 seconds. 4. Switch legs and repeat steps 1 through 3. 
5. Repeat 2 to 4 times. Follow-up care is a key part of your treatment and safety. Be sure to make and go to all appointments, and call your doctor if you are having problems. It's also a good idea to know your test results and keep a list of the medicines you take. Where can you learn more? Go to http://sue-gia.info/. Enter G946 in the search box to learn more about \"Sacroiliac Pain: Exercises. \" Current as of: March 21, 2017 Content Version: 11.4 © 5679-7278 [employer]. Care instructions adapted under license by [employer] (which disclaims liability or warranty for this information). If you have questions about a medical condition or this instruction, always ask your healthcare professional. [place] [address] any warranty or liability for your use of this information. Low Back Arthritis: Exercises Your Care Instructions Here are some examples of typical rehabilitation exercises for your condition. Start each exercise slowly. Ease off the exercise if you start to have pain. Your doctor or physical therapist will tell you when you can start these exercises and which ones will work best for you. When you are not being active, find a comfortable position for rest. Some people are comfortable on the floor or a medium-firm bed with a small pillow under their head and another under their knees. Some people prefer to lie on their side with a pillow between their knees. Don't stay in one position for too long. Take short walks (10 to 20 minutes) every 2 to 3 hours. Avoid slopes, hills, and stairs until you feel better. Walk only distances you can manage without pain, especially leg pain. How to do the exercises Pelvic tilt 8. Lie on your back with your knees bent. 9. \"Brace\" your stomach-tighten your muscles by pulling in and imagining your belly button moving toward your spine. 10. Press your lower back into the floor. You should feel your hips and pelvis rock back. 11. Hold for 6 seconds while breathing smoothly. 12. Relax and allow your pelvis and hips to rock forward. 13. Repeat 8 to 12 times. Back stretches 5. Get down on your hands and knees on the floor. 6. Relax your head and allow it to droop. Round your back up toward the ceiling until you feel a nice stretch in your upper, middle, and lower back. Hold this stretch for as long as it feels comfortable, or about 15 to 30 seconds. 7. Return to the starting position with a flat back while you are on your hands and knees. 8. Let your back sway by pressing your stomach toward the floor. Lift your buttocks toward the ceiling. 9. Hold this position for 15 to 30 seconds. 10. Repeat 2 to 4 times. Follow-up care is a key part of your treatment and safety. Be sure to make and go to all appointments, and call your doctor if you are having problems. It's also a good idea to know your test results and keep a list of the medicines you take. Where can you learn more? Go to http://sue-gia.info/. Enter I172 in the search box to learn more about \"Low Back Arthritis: Exercises. \" Current as of: March 21, 2017 Content Version: 11.4 © 8473-9642 Healthwise, Incorporated. Care instructions adapted under license by [employer] (which disclaims liability or warranty for this information). If you have questions about a medical condition or this instruction, always ask your healthcare professional. [address] any warranty or liability for your use of this information. Introducing [facility]!    
 Marj Rojas introduces [employer] patient portal. Now you can access parts of your medical record, email your doctor's office, and request medication refills online. 1. In your internet browser, go to [email or web address] [email or web address]. [email or web address] 2. Click on the First Time User? Click Here link in the Sign In box. You will see the New Member Sign Up page. 3. Enter your [employer] Access Code exactly as it appears below. You will not need to use this code after youve completed the sign-up process. If you do not sign up before the expiration date, you must request a new code. · [employer] Access Code: [email or web address] Expires: 1/24/2018  1:53 PM 
 
4. Enter the last four digits of your Social Security Number (xxxx) and Date of Birth (mm/dd/yyyy) as indicated and click Submit. You will be taken to the next sign-up page. 5. Create a [employer] ID. This will be your [employer] login ID and cannot be changed, so think of one that is secure and easy to remember. 6. Create a [employer] password. You can change your password at any time. 7. Enter your Password Reset Question and Answer. This can be used at a later time if you forget your password. 8. Enter your e-mail address. You will receive e-mail notification when new information is available in [address] 9. Click Sign Up. You can now view and download portions of your medical record. 10. Click the Download Summary menu link to download a portable copy of your medical information. If you have questions, please visit the Frequently Asked Questions section of the [employer] website. Remember, [employer] is NOT to be used for urgent needs. For medical emergencies, dial 911. Now available from your iPhone and Android! Please provide this summary of care documentation to your next provider. Your primary care clinician is listed as [address]. If you have any questions after today's visit, please call [phone].

## 2018-11-29 ENCOUNTER — OFFICE VISIT (OUTPATIENT)
Dept: ORTHOPEDIC SURGERY | Age: 63
End: 2018-11-29

## 2018-11-29 VITALS
WEIGHT: 252.6 LBS | DIASTOLIC BLOOD PRESSURE: 105 MMHG | OXYGEN SATURATION: 97 % | SYSTOLIC BLOOD PRESSURE: 149 MMHG | RESPIRATION RATE: 18 BRPM | HEART RATE: 74 BPM | HEIGHT: 72 IN | BODY MASS INDEX: 34.21 KG/M2

## 2018-11-29 DIAGNOSIS — M62.830 MUSCLE SPASM OF BACK: ICD-10-CM

## 2018-11-29 DIAGNOSIS — M47.816 LUMBAR FACET ARTHROPATHY: ICD-10-CM

## 2018-11-29 DIAGNOSIS — Z98.1 S/P LUMBAR FUSION: ICD-10-CM

## 2018-11-29 DIAGNOSIS — M53.3 PAIN OF BOTH SACROILIAC JOINTS: Primary | ICD-10-CM

## 2018-11-29 RX ORDER — CITALOPRAM 40 MG/1
TABLET, FILM COATED ORAL
COMMUNITY
End: 2020-11-04

## 2018-11-29 RX ORDER — HYDROCODONE POLISTIREX AND CHLORPHENIRAMINE POLISTIREX 10; 8 MG/5ML; MG/5ML
SUSPENSION, EXTENDED RELEASE ORAL
COMMUNITY
End: 2020-10-13 | Stop reason: ALTCHOICE

## 2018-11-29 RX ORDER — HYDROCHLOROTHIAZIDE 25 MG/1
25 TABLET ORAL DAILY
COMMUNITY

## 2018-11-29 RX ORDER — HYDROCODONE BITARTRATE AND ACETAMINOPHEN 5; 325 MG/1; MG/1
TABLET ORAL
COMMUNITY
Start: 2017-04-26 | End: 2020-11-04

## 2018-11-29 RX ORDER — METHYLPREDNISOLONE 4 MG/1
TABLET ORAL
Qty: 1 DOSE PACK | Refills: 1 | Status: SHIPPED | OUTPATIENT
Start: 2018-11-29 | End: 2020-10-13 | Stop reason: ALTCHOICE

## 2018-11-29 NOTE — PATIENT INSTRUCTIONS
Low Back Arthritis: Exercises Your Care Instructions Here are some examples of typical rehabilitation exercises for your condition. Start each exercise slowly. Ease off the exercise if you start to have pain. Your doctor or physical therapist will tell you when you can start these exercises and which ones will work best for you. When you are not being active, find a comfortable position for rest. Some people are comfortable on the floor or a medium-firm bed with a small pillow under their head and another under their knees. Some people prefer to lie on their side with a pillow between their knees. Don't stay in one position for too long. Take short walks (10 to 20 minutes) every 2 to 3 hours. Avoid slopes, hills, and stairs until you feel better. Walk only distances you can manage without pain, especially leg pain. How to do the exercises Pelvic tilt 1. Lie on your back with your knees bent. 2. \"Brace\" your stomachtighten your muscles by pulling in and imagining your belly button moving toward your spine. 3. Press your lower back into the floor. You should feel your hips and pelvis rock back. 4. Hold for 6 seconds while breathing smoothly. 5. Relax and allow your pelvis and hips to rock forward. 6. Repeat 8 to 12 times. Back stretches 1. Get down on your hands and knees on the floor. 2. Relax your head and allow it to droop. Round your back up toward the ceiling until you feel a nice stretch in your upper, middle, and lower back. Hold this stretch for as long as it feels comfortable, or about 15 to 30 seconds. 3. Return to the starting position with a flat back while you are on your hands and knees. 4. Let your back sway by pressing your stomach toward the floor. Lift your buttocks toward the ceiling. 5. Hold this position for 15 to 30 seconds. 6. Repeat 2 to 4 times. Follow-up care is a key part of your treatment and safety.  Be sure to make and go to all appointments, and call your doctor if you are having problems. It's also a good idea to know your test results and keep a list of the medicines you take. Where can you learn more? Go to http://sue-gia.info/. Enter H415 in the search box to learn more about \"Low Back Arthritis: Exercises. \" Current as of: November 29, 2017 Content Version: 11.8 © 2471-9088 Healthwise, [employer]acements.[employer]. Care instructions adapted under license by [employer] (which disclaims liability or warranty for this information). If you have questions about a medical condition or this instruction, always ask your healthcare professional. [address] any warranty or liability for your use of this information.

## 2018-11-29 NOTE — PROGRESS NOTES
[address] 
[address] [place] Phone: [phone] Fax: [phone] Pt's YOB: 1955 ASSESSMENT Diagnoses and all orders for this visit: 1. Pain of both sacroiliac joints 
-     methylPREDNISolone (MEDROL DOSEPACK) 4 mg tablet; Per dose pack instructions 2. Lumbar facet arthropathy 
-     methylPREDNISolone (MEDROL DOSEPACK) 4 mg tablet; Per dose pack instructions 3. Muscle spasm of back 4. S/P lumbar fusion IMPRESSION AND PLAN: 
Duane Light is a 59 y.o. male with history of chronic lumbar pain and was last seen on 10/26/2017. Pt complains of pain in the lower back/ buttocks but denies any pain radiating down the legs at this time. Pt reports that his back pain was completely alleviated in the past when he took a Medrol Dosepak. 1) Pt was given information on lumbar arthritis exercises. 2) He was prescribed a Medrol Dosepak with 1 refill. 3) Mr. Melisa Voss has a reminder for a \"due or due soon\" health maintenance. I have asked that he contact his primary care provider, Jerald Mac MD, for follow-up on this health maintenance. 4)  demonstrated consistency with prescribing. 5) Pt will follow-up as needed. HISTORY OF PRESENT ILLNESS: 
Duane Light is a 59 y.o. male with history of chronic lumbar pain and presents to the office today for follow up. He was last seen on 10/26/2017. Pt complains of pain in the lower back/ buttocks but denies any pain radiating down the legs at this time. He uses a cushion that her ordered off [address] with benefit. His pain is worse when standing and sitting for extended periods of time. He denies any pain when laying down but reports difficulty finding a comfortable position. Pt denies any pain or difficulty when rolling over in bed. He also denies any groin pain. Of note, he had lumbar surgery with Dr. Dino Blakely in 2002.  Pt had leaking CSF fluid and then had a revision surgery in . He notes increased pain a couple months ago when was crawling under his house to clean it out. Pt reports that his back pain was completely alleviated when he took a Medrol Dosepak. He regularly stretches every morning. Pt reports improvement when using a in version table. Pt at this time desires to proceed with a Medrol Dosepak. Pain Scale: 6/10 PCP: Diana Gutierrez MD 
  
No past medical history on file. Social History Socioeconomic History  Marital status:  Spouse name: Not on file  Number of children: Not on file  Years of education: Not on file  Highest education level: Not on file Social Needs  Financial resource strain: Not on file  Food insecurity - worry: Not on file  Food insecurity - inability: Not on file  Transportation needs - medical: Not on file  Transportation needs - non-medical: Not on file Occupational History  Not on file Tobacco Use  Smoking status: Former Smoker Packs/day: 1.00 Years: 15.00 Pack years: 15.00 Last attempt to quit: 2000 Years since quittin.9  Smokeless tobacco: Never Used Substance and Sexual Activity  Alcohol use: No  
 Drug use: No  
 Sexual activity: Not Currently Other Topics Concern  Not on file Social History Narrative  Not on file Current Outpatient Medications Medication Sig Dispense Refill  hydroCHLOROthiazide (HYDRODIURIL) 25 mg tablet hydrochlorothiazide 25 mg tablet TAKE 1 TABLET BY MOUTH EVERY DAY  cyclobenzaprine (FLEXERIL) 10 mg tablet Take  by mouth three (3) times daily as needed for Muscle Spasm(s).  azelastine (ASTELIN) 137 mcg (0.1 %) nasal spray INT 2 SPRAYS IEN BID  3  
 buPROPion XL (WELLBUTRIN XL) 300 mg XL tablet Take 300 mg by mouth every morning.     
 FLUoxetine (PROZAC) 20 mg capsule TK 1 C PO QD  3  
 omeprazole (PRILOSEC) 20 mg capsule TK 1 C PO BID  8  
  fluticasone (FLONASE) 50 mcg/actuation nasal spray SHAKE LQ AND U 2 SPRAYS IEN QD  12  
 citalopram (CELEXA) 40 mg tablet citalopram 40 mg tablet  HYDROcodone-acetaminophen (NORCO) 5-325 mg per tablet Take 1 Tab by Mouth Every 4 Hours As Needed.  chlorpheniramine-HYDROcodone (TUSSIONEX) 10-8 mg/5 mL suspension hydrocodone 10 mg-chlorpheniramine 8 mg/5 mL oral susp extend. rel 12hr  prazosin (MINIPRESS) 2 mg capsule TK 1 TO 2 CS PO QHS FOR TRAUMATIC DREAMS  11  
 oxyCODONE-acetaminophen (PERCOCET 10)  mg per tablet Take 1 Tab by mouth every six (6) hours as needed. Allergies Allergen Reactions  Chlorthalidone Other (comments) Low Blood Pressure  Effexor [Venlafaxine] Other (comments) High Blood Pressure  Keflex [Cephalexin] Itching REVIEW OF SYSTEMS Constitutional: Negative for fever, chills, or weight change. Respiratory: Negative for cough or shortness of breath. Cardiovascular: Negative for chest pain or palpitations. Gastrointestinal: Negative for acid reflux, change in bowel habits, or constipation. Genitourinary: Negative for dysuria and flank pain. Musculoskeletal: Positive for lumbar pain. Skin: Negative for rash. Neurological: Negative for headaches, dizziness, or numbness. Endo/Heme/Allergies: Negative for increased bruising. Psychiatric/Behavioral: Negative for difficulty with sleep. PHYSICAL EXAMINATION Visit Vitals BP (!) 149/105 Pulse 74 Resp 18 Ht 6' (1.829 m) Wt 252 lb 9.6 oz (114.6 kg) SpO2 97% BMI 34.26 kg/m² Constitutional: Awake, alert, and in no acute distress. Neurological: 1+ symmetrical DTRs in the lower extremities. Sensation to light touch is intact. Skin: warm, dry, and intact. Musculoskeletal: Tenderness to palpation in the lower lumbar region. Minimal tenderness  over the sacroiliac joints.  Moderate pain with extension and axial loading. No pain with internal or external rotation of his hips. Negative straight leg raise bilaterally. Negative slump test bilaterally. Hip Flex  Quads Hamstrings Ankle DF EHL Ankle PF Right +4/5 +4/5 +4/5 +4/5 +4/5 +4/5 Left +4/5 +4/5 +4/5 +4/5 +4/5 +4/5 IMAGING: 
 
Lumbar 4V X-rays from 02/07/2017 were personally reviewed with the patient and demonstrated: 
1) Mild scoliosis. 2) L5-S1 fusion. 3) Degenerative facet at L3 and L4. Written by Drew Warren, as dictated by Jenny Alexander MD. 
I, Dr. Jenny Alexander confirm that all documentation is accurate.

## 2019-01-17 ENCOUNTER — HOSPITAL ENCOUNTER (OUTPATIENT)
Dept: GENERAL RADIOLOGY | Age: 64
Discharge: HOME | End: 2019-01-17
Payer: COMMERCIAL

## 2019-01-17 DIAGNOSIS — R05.9 COUGH: ICD-10-CM

## 2019-01-17 PROCEDURE — 71046 X-RAY EXAM CHEST 2 VIEWS: CPT

## 2020-01-28 ENCOUNTER — HOSPITAL ENCOUNTER (OUTPATIENT)
Dept: GENERAL RADIOLOGY | Age: 65
Discharge: HOME | End: 2020-01-28
Payer: COMMERCIAL

## 2020-01-28 DIAGNOSIS — J44.9 COPD (CHRONIC OBSTRUCTIVE PULMONARY DISEASE) (HCC): ICD-10-CM

## 2020-01-28 PROCEDURE — 71046 X-RAY EXAM CHEST 2 VIEWS: CPT

## 2020-10-12 NOTE — PROGRESS NOTES
[facility]  [address] [place], [address]  Phone: [phone]  Fax: [phone]    Pt's YOB: 1955    ASSESSMENT   Diagnoses and all orders for this visit:    1. Acute midline low back pain with bilateral sciatica  -     MRI LUMB SPINE W WO CONT; Future    2. Lumbar pain  -     AMB POC XRAY, SPINE, LUMBOSACRAL; 4+    3. Lumbar facet arthropathy  -     methylPREDNISolone (MEDROL DOSEPACK) 4 mg tablet; Per dose pack instructions  -     MRI LUMB SPINE W WO CONT; Future    4. Muscle spasm of back  -     MRI LUMB SPINE W WO CONT; Future    5. S/P lumbar fusion  -     MRI LUMB SPINE W WO CONT; Future         IMPRESSION AND PLAN:  Maryann Issa is a 60 y.o. male with history of chronic lumbar pain. Pt reports severe pain in the lower back when he picked up his 80 y.o. father off the toilet about 6 weeks ago. He reports relief in his severe pain within hours of taking a Medrol Dosepak. Pt admits to continued pain in the lower back and reports occasional weakness in the legs. 1) Pt was given information on lumbar arthritis exercises. 2) He was prescribed a Medrol Dosepak. 3) Pt was counseled on proper lifting mechanics. 4) I recommended the patient try ricardo chi, water exercise, and beginner's/chair yoga. 5) A lumbar MRI was ordered. He has increased constant lumbar pain, weakness in the legs, and has taken NSAID's-- concern for spinal stenosis. 6) Mr. Monica Gongora has a reminder for a \"due or due soon\" health maintenance. I have asked that he contact his primary care provider, Nalini Madrid MD, for follow-up on this health maintenance. 7)  demonstrated consistency with prescribing. Follow-up and Dispositions    · Return in 4 weeks (on 11/10/2020), or if symptoms worsen or fail to improve, for Medication follow up, Diagnostic Test follow up.              HISTORY OF PRESENT ILLNESS:  Maryann Issa is a 60 y.o. male with history of chronic lumbar pain and presents to the office today for follow up and was last seen on 11/29/2018. Pt reports severe pain in the lower back when he picked up his 80 y.o. father off the toilet about 6 weeks ago. Of note, his father fractured his hip so the patient moved in with him. When his father had difficulty getting off the commode, the patient picked him up and reports immediate severe pain in the lower back but denies any pain radiating down the legs. Pt notes that his father is now on hospice in a facility and his dementia is progressing quickly. He reports increased pain when bending forward and states that he is no longer able to work on cars secondary to pain. Pt also reports increased pain when standing for prolonged periods of time and notes that he is only able to woodwork in his garage for about 1 hour at a time. He reports relief in his severe pain within hours of taking a Medrol Dosepak. Pt admits to continued pain in the lower back and reports occasional weakness in the legs. He performs home exercises with benefit. Pt also uses an inversion table 2-3 x a day and undergoes chiropractic adjustments regularly. Of note, he had 2 previous surgeries with Dr. Christofer Arteaga and last had surgery in 2003. He takes ibuprofen, Flexeril 10 mg, and Ultram 50 mg rarely as needed with minimal relief. Pt at this time desires to proceed with medication evaluation and a lumbar MRI. Pain Scale: 5/10    PCP: Patricia Montaño MD     History reviewed. No pertinent past medical history.      Social History     Socioeconomic History    Marital status:      Spouse name: Not on file    Number of children: Not on file    Years of education: Not on file    Highest education level: Not on file   Occupational History    Not on file   Social Needs    Financial resource strain: Not on file    Food insecurity     Worry: Not on file     Inability: Not on file    Transportation needs     Medical: Not on file Non-medical: Not on file   Tobacco Use    Smoking status: Former Smoker     Packs/day: 1.00     Years: 15.00     Pack years: 15.00     Last attempt to quit: 2000     Years since quittin.8    Smokeless tobacco: Never Used   Substance and Sexual Activity    Alcohol use: No    Drug use: No    Sexual activity: Not Currently   Lifestyle    Physical activity     Days per week: Not on file     Minutes per session: Not on file    Stress: Not on file   Relationships    Social connections     Talks on phone: Not on file     Gets together: Not on file     Attends Rastafari service: Not on file     Active member of club or organization: Not on file     Attends meetings of clubs or organizations: Not on file     Relationship status: Not on file    Intimate partner violence     Fear of current or ex partner: Not on file     Emotionally abused: Not on file     Physically abused: Not on file     Forced sexual activity: Not on file   Other Topics Concern    Not on file   Social History Narrative    Not on file       Current Outpatient Medications   Medication Sig Dispense Refill    budesonide-formoteroL (Symbicort) 160-4.5 mcg/actuation HFAA Take 2 Puffs by inhalation two (2) times a day.  albuterol (Ventolin HFA) 90 mcg/actuation inhaler Take 2 Puffs by inhalation every four (4) hours as needed for Wheezing.  traMADoL (ULTRAM) 50 mg tablet Take 50 mg by mouth every six (6) hours as needed for Pain.  benzonatate (TESSALON) 200 mg capsule Take 200 mg by mouth three (3) times daily as needed for Cough.  methylPREDNISolone (MEDROL DOSEPACK) 4 mg tablet Per dose pack instructions 1 Dose Pack 0    hydroCHLOROthiazide (HYDRODIURIL) 25 mg tablet Take 25 mg by mouth daily.  cyclobenzaprine (FLEXERIL) 10 mg tablet Take  by mouth three (3) times daily as needed for Muscle Spasm(s).       azelastine (ASTELIN) 137 mcg (0.1 %) nasal spray 2 Sprays by Both Nostrils route two (2) times daily as needed. 3    buPROPion XL (WELLBUTRIN XL) 300 mg XL tablet Take 300 mg by mouth every morning.  FLUoxetine (PROZAC) 20 mg capsule Take 20 mg by mouth daily. 3    omeprazole (PRILOSEC) 20 mg capsule Take 20 mg by mouth daily. 8    fluticasone (FLONASE) 50 mcg/actuation nasal spray SHAKE LQ AND U 2 SPRAYS IEN QD  12    citalopram (CELEXA) 40 mg tablet citalopram 40 mg tablet      HYDROcodone-acetaminophen (NORCO) 5-325 mg per tablet Take 1 Tab by Mouth Every 4 Hours As Needed.  prazosin (MINIPRESS) 2 mg capsule TK 1 TO 2 CS PO QHS FOR TRAUMATIC DREAMS  11    oxyCODONE-acetaminophen (PERCOCET 10)  mg per tablet Take 1 Tab by mouth every six (6) hours as needed. Allergies   Allergen Reactions    Chlorthalidone Other (comments)     Low Blood Pressure    Effexor [Venlafaxine] Other (comments)     High Blood Pressure      Keflex [Cephalexin] Itching         REVIEW OF SYSTEMS    Constitutional: Negative for fever, chills, or weight change. Respiratory: Negative for cough or shortness of breath. Cardiovascular: Negative for chest pain or palpitations. Gastrointestinal: Negative for acid reflux, change in bowel habits, or constipation. Genitourinary: Negative for dysuria and flank pain. Musculoskeletal: Positive for lumbar pain. Skin: Negative for rash. Neurological: Negative for headaches, dizziness, or numbness. Endo/Heme/Allergies: Negative for increased bruising. Psychiatric/Behavioral: Negative for difficulty with sleep. PHYSICAL EXAMINATION  Visit Vitals  /78   Pulse 69   Temp 97.3 °F (36.3 °C) (Oral)   Resp 16   Ht 6' (1.829 m)   Wt 240 lb 12.8 oz (109.2 kg)   SpO2 98%   BMI 32.66 kg/m²       Constitutional: Awake, alert, and in no acute distress. Neurological: 1+ symmetrical DTRs in the upper extremities. 1+ symmetrical DTRs in the lower extremities. Sensation to light touch is intact. Negative Clemens's sign bilaterally. Skin: warm, dry, and intact. Musculoskeletal: Difficulty transitioning from sit to stand. No tenderness to palpation in the lower lumbar region. Moderate pain with extension, axial loading, and forward flexion. No pain with internal or external rotation of his hips. Negative straight leg raise bilaterally. Hip Flex  Quads Hamstrings Ankle DF EHL Ankle PF   Right +4/5 +4/5 +4/5 +4/5 +4/5 +4/5   Left +4/5 +4/5 +4/5 +4/5 +4/5 +4/5     IMAGING:    Lumbar 4V x-rays from 10/13/2020 were personally reviewed with the patient and demonstrated:  L5-S1 fusion. Hardware is intact. Severe degenerative disc at L4-5 with multilevel degenerative facet. No instability. Inflammation in the SI joints bilaterally. Written by Kayla Resendiz, as dictated by Martha Hyman MD.  I, Dr. Martha Hyman confirm that all documentation is accurate.

## 2020-10-13 ENCOUNTER — OFFICE VISIT (OUTPATIENT)
Dept: ORTHOPEDIC SURGERY | Age: 65
End: 2020-10-13
Payer: COMMERCIAL

## 2020-10-13 VITALS
RESPIRATION RATE: 16 BRPM | OXYGEN SATURATION: 98 % | HEART RATE: 69 BPM | SYSTOLIC BLOOD PRESSURE: 129 MMHG | WEIGHT: 240.8 LBS | BODY MASS INDEX: 32.61 KG/M2 | TEMPERATURE: 97.3 F | DIASTOLIC BLOOD PRESSURE: 78 MMHG | HEIGHT: 72 IN

## 2020-10-13 DIAGNOSIS — M54.41 ACUTE MIDLINE LOW BACK PAIN WITH BILATERAL SCIATICA: Primary | ICD-10-CM

## 2020-10-13 DIAGNOSIS — M62.830 MUSCLE SPASM OF BACK: ICD-10-CM

## 2020-10-13 DIAGNOSIS — M54.42 ACUTE MIDLINE LOW BACK PAIN WITH BILATERAL SCIATICA: Primary | ICD-10-CM

## 2020-10-13 DIAGNOSIS — M47.816 LUMBAR FACET ARTHROPATHY: ICD-10-CM

## 2020-10-13 DIAGNOSIS — Z98.1 S/P LUMBAR FUSION: ICD-10-CM

## 2020-10-13 DIAGNOSIS — M54.50 LUMBAR PAIN: ICD-10-CM

## 2020-10-13 PROCEDURE — 99214 OFFICE O/P EST MOD 30 MIN: CPT | Performed by: PHYSICAL MEDICINE & REHABILITATION

## 2020-10-13 PROCEDURE — 72110 X-RAY EXAM L-2 SPINE 4/>VWS: CPT | Performed by: PHYSICAL MEDICINE & REHABILITATION

## 2020-10-13 RX ORDER — ALBUTEROL SULFATE 90 UG/1
2 AEROSOL, METERED RESPIRATORY (INHALATION)
COMMUNITY

## 2020-10-13 RX ORDER — TRAMADOL HYDROCHLORIDE 50 MG/1
50 TABLET ORAL
COMMUNITY

## 2020-10-13 RX ORDER — BENZONATATE 200 MG/1
200 CAPSULE ORAL
COMMUNITY
End: 2020-11-04

## 2020-10-13 RX ORDER — METHYLPREDNISOLONE 4 MG/1
TABLET ORAL
Qty: 1 DOSE PACK | Refills: 0 | Status: SHIPPED | OUTPATIENT
Start: 2020-10-13 | End: 2020-11-04

## 2020-10-13 RX ORDER — BUDESONIDE AND FORMOTEROL FUMARATE DIHYDRATE 160; 4.5 UG/1; UG/1
2 AEROSOL RESPIRATORY (INHALATION) 2 TIMES DAILY
COMMUNITY

## 2020-10-13 NOTE — LETTER
10/14/20 Patient: Dario Matthew YOB: 1955 Date of Visit: 10/13/2020 MD Olga Roger [address] [place] [id number] VIA Facsimile: [phone] Dear Mireya Barton MD, Thank you for referring Mr. Dario Matthew to [address] for evaluation. My notes for this consultation are attached. If you have questions, please do not hesitate to call me. I look forward to following your patient along with you. Sincerely, Edu Amin MD

## 2020-10-13 NOTE — PATIENT INSTRUCTIONS
Low Back Arthritis: Exercises Introduction Here are some examples of typical rehabilitation exercises for your condition. Start each exercise slowly. Ease off the exercise if you start to have pain. Your doctor or physical therapist will tell you when you can start these exercises and which ones will work best for you. When you are not being active, find a comfortable position for rest. Some people are comfortable on the floor or a medium-firm bed with a small pillow under their head and another under their knees. Some people prefer to lie on their side with a pillow between their knees. Don't stay in one position for too long. Take short walks (10 to 20 minutes) every 2 to 3 hours. Avoid slopes, hills, and stairs until you feel better. Walk only distances you can manage without pain, especially leg pain. How to do the exercises Pelvic tilt 1. Lie on your back with your knees bent. 2. \"Brace\" your stomachtighten your muscles by pulling in and imagining your belly button moving toward your spine. 3. Press your lower back into the floor. You should feel your hips and pelvis rock back. 4. Hold for 6 seconds while breathing smoothly. 5. Relax and allow your pelvis and hips to rock forward. 6. Repeat 8 to 12 times. Back stretches 1. Get down on your hands and knees on the floor. 2. Relax your head and allow it to droop. Round your back up toward the ceiling until you feel a nice stretch in your upper, middle, and lower back. Hold this stretch for as long as it feels comfortable, or about 15 to 30 seconds. 3. Return to the starting position with a flat back while you are on your hands and knees. 4. Let your back sway by pressing your stomach toward the floor. Lift your buttocks toward the ceiling. 5. Hold this position for 15 to 30 seconds. 6. Repeat 2 to 4 times. Follow-up care is a key part of your treatment and safety.  Be sure to make and go to all appointments, and call your doctor if you are having problems. It's also a good idea to know your test results and keep a list of the medicines you take. Where can you learn more? Go to http://www.gray.com/ Enter A464 in the search box to learn more about \"Low Back Arthritis: Exercises. \" Current as of: March 2, 2020               Content Version: 12.6 © 2006-2020 [employer]. Care instructions adapted under license by [employer] (which disclaims liability or warranty for this information). If you have questions about a medical condition or this instruction, always ask your healthcare professional. [address] any warranty or liability for your use of this information.

## 2020-10-20 DIAGNOSIS — M54.41 ACUTE MIDLINE LOW BACK PAIN WITH BILATERAL SCIATICA: ICD-10-CM

## 2020-10-20 DIAGNOSIS — M62.830 MUSCLE SPASM OF BACK: ICD-10-CM

## 2020-10-20 DIAGNOSIS — Z98.1 S/P LUMBAR FUSION: ICD-10-CM

## 2020-10-20 DIAGNOSIS — M54.42 ACUTE MIDLINE LOW BACK PAIN WITH BILATERAL SCIATICA: ICD-10-CM

## 2020-10-20 DIAGNOSIS — M47.816 LUMBAR FACET ARTHROPATHY: ICD-10-CM

## 2020-11-04 ENCOUNTER — OFFICE VISIT (OUTPATIENT)
Dept: CARDIOLOGY CLINIC | Age: 65
End: 2020-11-04
Payer: COMMERCIAL

## 2020-11-04 VITALS
SYSTOLIC BLOOD PRESSURE: 154 MMHG | BODY MASS INDEX: 32.91 KG/M2 | DIASTOLIC BLOOD PRESSURE: 100 MMHG | HEART RATE: 76 BPM | HEIGHT: 72 IN | OXYGEN SATURATION: 98 % | WEIGHT: 243 LBS

## 2020-11-04 DIAGNOSIS — K21.9 GASTROESOPHAGEAL REFLUX DISEASE, UNSPECIFIED WHETHER ESOPHAGITIS PRESENT: ICD-10-CM

## 2020-11-04 DIAGNOSIS — Q24.8 PERICARDIAL CYST: Primary | ICD-10-CM

## 2020-11-04 DIAGNOSIS — I10 ESSENTIAL HYPERTENSION: ICD-10-CM

## 2020-11-04 DIAGNOSIS — Q24.8 PERICARDIAL CYST: ICD-10-CM

## 2020-11-04 DIAGNOSIS — J45.909 ASTHMA, UNSPECIFIED ASTHMA SEVERITY, UNSPECIFIED WHETHER COMPLICATED, UNSPECIFIED WHETHER PERSISTENT: ICD-10-CM

## 2020-11-04 DIAGNOSIS — Z98.1 S/P LUMBAR FUSION: ICD-10-CM

## 2020-11-04 DIAGNOSIS — J61 ASBESTOSIS (HCC): ICD-10-CM

## 2020-11-04 PROCEDURE — 93000 ELECTROCARDIOGRAM COMPLETE: CPT | Performed by: INTERNAL MEDICINE

## 2020-11-04 PROCEDURE — 99204 OFFICE O/P NEW MOD 45 MIN: CPT | Performed by: INTERNAL MEDICINE

## 2020-11-04 NOTE — PROGRESS NOTES
Nereida Kraus presents today for   Chief Complaint   Patient presents with    New Patient     Ref by [address] for pericardial cysts       Nereida Kraus preferred language for health care discussion is english/other. Is someone accompanying this pt? no    Is the patient using any DME equipment during [address]? no    Depression Screening:  3 most recent PHQ Screens 11/4/2020   Little interest or pleasure in doing things Not at all   Feeling down, depressed, irritable, or hopeless Not at all   Total Score PHQ 2 0       Learning Assessment:  Learning Assessment 11/4/2020   PRIMARY LEARNER Patient   PRIMARY LANGUAGE ENGLISH   LEARNER PREFERENCE PRIMARY DEMONSTRATION   ANSWERED BY john   RELATIONSHIP SELF       Abuse Screening:  Abuse Screening Questionnaire 11/4/2020   Do you ever feel afraid of your partner? N   Are you in a relationship with someone who physically or mentally threatens you? N   Is it safe for you to go home? Y       Fall Risk  Fall Risk Assessment, last 12 mths 11/4/2020   Able to walk? Yes   Fall in past 12 months? No       Pt currently taking Anticoagulant therapy? no    Coordination of Care:  1. Have you been to the ER, urgent care clinic since your last visit? Hospitalized since your last visit? no    2. Have you seen or consulted any other health care providers outside of the [address] [place] [address] since your last visit? Include any pap smears or colon screening.  no

## 2020-11-04 NOTE — PROGRESS NOTES
History of Present Illness:  76-year-old male referred for history of pericardial cyst.  About ten years ago he had a CT of the chest showing 64 x 20 mm cyst in the right lateral aspect of the heart. At that point it was simply closely monitored. He did not have any follow up imaging or echocardiogram, but he has been completely asymptomatic. He denies chest pain, dyspnea, PND, orthopnea, edema. He is planning to get an MRI scan of his lower back on November 10th and following up with orthopedic surgery. He had back surgery back in 2003. The pain continues to worsen. He has seen Dr. Poonam Toledo for history of asbestosis. He also has a history of asthma. Overall he is doing well today. Impression:  1. History of pericardial cyst, 64 x 20 mm, in the right lateral border of the heart by CT scan back in 2010. No obvious symptoms at this point. 2. Chronic back pain with history of lower back surgery 2003, planning to get MRI November 10th and following up with surgery. 3. No limitation of functional status. 4. History of hypertension. 5. History of asbestosis and asthma. Plan: At this point I will check an echocardiogram to help visualize the pericardial cyst and make sure there is no functional external compression. He is also getting an MRI of the spine, which may allow us to partially visualize the cyst as well. If this is unchanged in size or improved, I would recommend follow up in the next year or two. All questions answered. Past Medical History:   Diagnosis Date    Essential hypertension        Current Outpatient Medications   Medication Sig Dispense Refill    budesonide-formoteroL (Symbicort) 160-4.5 mcg/actuation HFAA Take 2 Puffs by inhalation two (2) times a day.  albuterol (Ventolin HFA) 90 mcg/actuation inhaler Take 2 Puffs by inhalation every four (4) hours as needed for Wheezing.       traMADoL (ULTRAM) 50 mg tablet Take 50 mg by mouth every six (6) hours as needed for Pain.      hydroCHLOROthiazide (HYDRODIURIL) 25 mg tablet Take 25 mg by mouth daily.  cyclobenzaprine (FLEXERIL) 10 mg tablet Take  by mouth three (3) times daily as needed for Muscle Spasm(s).  azelastine (ASTELIN) 137 mcg (0.1 %) nasal spray 2 Sprays by Both Nostrils route two (2) times daily as needed. 3    FLUoxetine (PROZAC) 20 mg capsule Take 20 mg by mouth daily. 3    omeprazole (PRILOSEC) 20 mg capsule Take 20 mg by mouth daily. 8    fluticasone (FLONASE) 50 mcg/actuation nasal spray SHAKE LQ AND U 2 SPRAYS IEN QD  12       Social History   reports that he quit smoking about 20 years ago. He has a 15.00 pack-year smoking history. He has never used smokeless tobacco.   reports no history of alcohol use. Family History  family history includes Heart Failure in his mother. Review of Systems  Except as stated above include:  Constitutional: Negative for fever, chills and malaise/fatigue. HEENT: No congestion or recent URI. Gastrointestinal: No nausea, vomiting, abdominal pain, bloody stools. Pulmonary:  Negative except as stated above. Cardiac:  Negative except as stated above. Musculoskeletal: Negative except as stated above. Neurological:  No localized symptoms. Skin:  Negative except as stated above. Psych:  Negative except as stated above. Endocrine:  Negative except as stated above. PHYSICAL EXAM  BP Readings from Last 3 Encounters:   11/04/20 (!) 154/100   10/13/20 129/78   11/29/18 (!) 149/105     Pulse Readings from Last 3 Encounters:   11/04/20 76   10/13/20 69   11/29/18 74     Wt Readings from Last 3 Encounters:   11/04/20 110.2 kg (243 lb)   10/13/20 109.2 kg (240 lb 12.8 oz)   11/29/18 114.6 kg (252 lb 9.6 oz)     General:   Well developed, well groomed. Head/Neck:   No obvious jugular venous distention     No obvious carotid pulsations. No evidence of xanthelasma. Lungs:   No respiratory distress. Clear bilaterally. Heart:  Regular rate and rhythm. Normal S1/S2. Palpation grossly normal.    No significant murmurs, rubs or gallops. Abdomen:   Non-acute abdomen. No obvious pulsations. Extremities:   Intact peripheral pulses. No significant edema. Neurological:   Alert and oriented to person, place, time. No focal neurological deficit visually. Skin:   No obvious rash    Blood Pressure Metric:  Monitor recommended and adjustments stated if needed.

## 2020-11-13 LAB
ECHO AO ROOT DIAM: 4.67 CM
ECHO LA AREA 4C: 16.29 CM2
ECHO LA VOL 2C: 66.76 ML (ref 18–58)
ECHO LA VOL 4C: 40.16 ML (ref 18–58)
ECHO LA VOL BP: 60.34 ML (ref 18–58)
ECHO LA VOL BP: 68.89 ML (ref 18–58)
ECHO LA VOLUME INDEX A2C: 28.85 ML/M2 (ref 16–28)
ECHO LA VOLUME INDEX A4C: 17.35 ML/M2 (ref 16–28)
ECHO LV E' LATERAL VELOCITY: 10.92 CM/S
ECHO LV E' SEPTAL VELOCITY: 7.14 CM/S
ECHO LV INTERNAL DIMENSION DIASTOLIC: 5.93 CM (ref 4.2–5.9)
ECHO LV INTERNAL DIMENSION SYSTOLIC: 3.99 CM
ECHO LV IVSD: 1.14 CM (ref 0.6–1)
ECHO LV MASS 2D: 306.3 G (ref 88–224)
ECHO LV MASS INDEX 2D: 132.3 G/M2 (ref 49–115)
ECHO LV POSTERIOR WALL DIASTOLIC: 1.25 CM (ref 0.6–1)
ECHO LVOT CARDIAC OUTPUT: 5.08 LITER/MINUTE
ECHO LVOT DIAM: 2.39 CM
ECHO LVOT PEAK GRADIENT: 2.6 MMHG
ECHO LVOT PEAK VELOCITY: 80.64 CM/S
ECHO LVOT SV: 80.2 ML
ECHO LVOT VTI: 17.86 CM
ECHO MV A VELOCITY: 67.28 CM/S
ECHO MV E DECELERATION TIME (DT): 210.77 MS
ECHO MV E VELOCITY: 54.97 CM/S
ECHO MV E/A RATIO: 0.82
ECHO MV E/E' LATERAL: 5.03
ECHO MV E/E' RATIO (AVERAGED): 6.37
ECHO MV E/E' SEPTAL: 7.7
ECHO PVEIN A DURATION: 94.4 MS
ECHO PVEIN A VELOCITY: 32.41 CM/S
ECHO RV TAPSE: 2.81 CM (ref 1.5–2)
ECHO TV REGURGITANT MAX VELOCITY: 252.97 CM/S
ECHO TV REGURGITANT PEAK GRADIENT: 25.6 MMHG
LVOT MG: 1.34 MMHG

## 2020-11-17 NOTE — PROGRESS NOTES
Per your last note   At this point I will check an echocardiogram to help visualize the pericardial cyst and make sure there is no functional external compression. He is also getting an MRI of the spine, which may allow us to partially visualize the cyst as well. If this is unchanged in size or improved, I would recommend follow up in the next year or two. All questions answered.

## 2020-11-30 ENCOUNTER — TELEPHONE (OUTPATIENT)
Dept: ORTHOPEDIC SURGERY | Age: 65
End: 2020-11-30

## 2020-11-30 NOTE — TELEPHONE ENCOUNTER
----- Message from Kayli Bethea MD sent at 11/29/2020 10:48 PM EST -----  Pt was supposed to follow up in mid -November and also an MRI was ordered -- I don't see that it has been done yet -- does he still need this?  ----- Message -----  From: Lety Patricia  Sent: 10/13/2020   9:37 AM EST  To: Kayli Bethea MD

## 2020-12-02 NOTE — TELEPHONE ENCOUNTER
CALLED PT AND PT STATES HIS PORTION OF MRI WOULD BE $900 AND HE CANCELLED.  PT STATES HE WILL HAVE MEDICARE IN 3 MONTHS AND WILL CALL BACK TO RS AFTER

## 2021-01-20 ENCOUNTER — TELEPHONE (OUTPATIENT)
Dept: CARDIOLOGY CLINIC | Age: 66
End: 2021-01-20

## 2021-01-20 NOTE — TELEPHONE ENCOUNTER
----- Message from Eve Tinoco MD sent at 11/17/2020  3:49 PM EST -----  Please let patient know small pericardial cyst present, seems smaller than previous, about 1cm at this time, no changes, see in 1 year. Thx  ----- Message -----  From: Morris Esparza RN  Sent: 11/17/2020   2:44 PM EST  To: Eve Tinoco MD    Per your last note   At this point I will check an echocardiogram to help visualize the pericardial cyst and make sure there is no functional external compression. He is also getting an MRI of the spine, which may allow us to partially visualize the cyst as well. If this is unchanged in size or improved, I would recommend follow up in the next year or two. All questions answered.

## 2021-01-20 NOTE — LETTER
1/21/2021 Galen Cory Pugh Daisha [address] [place] [id number] Dear Mr. Riosmario Justice, We have been unable to reach you by phone to notify you of your test results. Please call our office at [phone] and ask to speak with my nurse in order to explain these results to you and advise you of any recommendations. Sincerely, Erika Jacobo MD

## 2021-04-29 ENCOUNTER — HOSPITAL ENCOUNTER (OUTPATIENT)
Dept: GENERAL RADIOLOGY | Age: 66
Discharge: HOME | End: 2021-04-29
Payer: MEDICARE

## 2021-04-29 DIAGNOSIS — J44.9 COPD (CHRONIC OBSTRUCTIVE PULMONARY DISEASE) (HCC): ICD-10-CM

## 2021-04-29 PROCEDURE — 71046 X-RAY EXAM CHEST 2 VIEWS: CPT

## 2022-03-18 PROBLEM — Z98.1 S/P LUMBAR FUSION: Status: ACTIVE | Noted: 2017-02-07

## 2022-03-18 PROBLEM — M53.3 PAIN OF BOTH SACROILIAC JOINTS: Status: ACTIVE | Noted: 2017-02-07

## 2022-03-19 PROBLEM — M47.816 LUMBAR FACET ARTHROPATHY: Status: ACTIVE | Noted: 2017-02-07

## 2022-03-20 PROBLEM — M62.830 MUSCLE SPASM OF BACK: Status: ACTIVE | Noted: 2017-02-07

## 2022-10-28 NOTE — PROGRESS NOTES
[facility]  [address] [place], [facility] [address]  Phone: [phone]  Fax: [phone]    Pt's YOB: 1955    ASSESSMENT   Diagnoses and all orders for this visit:    1. Lumbar facet arthropathy  -     methylPREDNISolone (MEDROL DOSEPACK) 4 mg tablet; Per dose pack instructions    2. Lumbar pain  -     methylPREDNISolone (MEDROL DOSEPACK) 4 mg tablet; Per dose pack instructions    3. Thoracic spine pain  -     methylPREDNISolone (MEDROL DOSEPACK) 4 mg tablet; Per dose pack instructions    4. Sacroiliac joint disease  -     methylPREDNISolone (MEDROL DOSEPACK) 4 mg tablet; Per dose pack instructions    5. History of lumbar fusion       IMPRESSION AND PLAN:  Dylan Painter is a 78 y.o. male with history of chronic lumbar pain and presents to the office today for follow up, he was last seen by me on 10/13/2020 where he was prescribed a Medrol Dosepak. He reports relief in his severe pain episodes within hours of taking a Medrol Dosepak. 1) Pt was given information on lumbar arthritis and sacroiliac exercises. He was also given information on the Mediterranean diet. 2) A Medrol Dosepak was prescribed for acute inflammatory pain. 3) Mr. Agnes Riley has a reminder for a \"due or due soon\" health maintenance. I have asked that he contact his primary care provider, Heron Lopez DO, for follow-up on this health maintenance. 4)  demonstrated consistency with prescribing. Follow-up and Dispositions    Return in about 6 months (around 4/30/2023) for Medication follow up. HISTORY OF PRESENT ILLNESS:  Dylan Painter is a 78 y.o. male with history of chronic lumbar pain and presents to the office today for follow up, he was last seen by me on 10/13/2020 where he was prescribed a Medrol Dosepak.  He continues to complain of pain in the lumbar pain with a new onset of pain in between the shoulder blades not incited by any specific trauma or incident. Pt reports severe pain in the lower back when he picked up his 80 y.o. father off the toilet two years ago. Of note, his father fractured his hip so the patient moved in with him. When his father had difficulty getting off the commode, the patient picked him up and reports immediate severe pain in the lower back but denies any pain radiating down the legs. He reports relief in his severe pain episodes within hours of taking a Medrol Dosepak. Of note, he had 2 previous surgeries with Dr. Tg Zamorano and last had surgery in . Pt notes sleeping exacerbates his lower back pain with the continuous twisting and turning. He also notes his episodes of severe pain happen every 6 months, indicating he usually only need a Medrol Dosepak every six months. Pt at this time desires to proceed with medication evaluation.     Pain Scale: 4/10    PCP: Sharath Age, DO     Past Medical History:   Diagnosis Date    Essential hypertension         Social History     Socioeconomic History    Marital status:      Spouse name: Not on file    Number of children: Not on file    Years of education: Not on file    Highest education level: Not on file   Occupational History    Not on file   Tobacco Use    Smoking status: Former     Packs/day: 1.00     Years: 15.00     Pack years: 15.00     Types: Cigarettes     Quit date: 2000     Years since quittin.8    Smokeless tobacco: Never   Substance and Sexual Activity    Alcohol use: No    Drug use: No    Sexual activity: Not Currently   Other Topics Concern    Not on file   Social History Narrative    Not on file     Social Determinants of Health     Financial Resource Strain: Not on file   Food Insecurity: Not on file   Transportation Needs: Not on file   Physical Activity: Not on file   Stress: Not on file   Social Connections: Not on file   Intimate Partner Violence: Not on file   Housing Stability: Not on file       Current Outpatient Medications Medication Sig Dispense Refill    benzonatate (TESSALON) 100 mg capsule TAKE 1 CAPSULE BY MOUTH THREE TIMES A DAY FOR 15 DAYS      buPROPion XL (WELLBUTRIN XL) 300 mg XL tablet       famotidine (PEPCID) 40 mg tablet famotidine 40 mg tablet   TAKE 1 TABLET BY MOUTH TWICE A DAY      losartan (COZAAR) 25 mg tablet losartan 25 mg tablet   TAKE 1 TABLET BY MOUTH EVERY DAY      methylPREDNISolone (MEDROL DOSEPACK) 4 mg tablet Per dose pack instructions 1 Dose Pack 0    budesonide-formoteroL (SYMBICORT) 160-4.5 mcg/actuation HFAA Take 2 Puffs by inhalation two (2) times a day. albuterol (PROVENTIL HFA, VENTOLIN HFA, PROAIR HFA) 90 mcg/actuation inhaler Take 2 Puffs by inhalation every four (4) hours as needed for Wheezing. hydroCHLOROthiazide (HYDRODIURIL) 25 mg tablet Take 25 mg by mouth daily. cyclobenzaprine (FLEXERIL) 10 mg tablet Take  by mouth three (3) times daily as needed for Muscle Spasm(s). azelastine (ASTELIN) 137 mcg (0.1 %) nasal spray 2 Sprays by Both Nostrils route two (2) times daily as needed. 3    omeprazole (PRILOSEC) 20 mg capsule Take 20 mg by mouth daily. 8    traMADoL (ULTRAM) 50 mg tablet Take 50 mg by mouth every six (6) hours as needed for Pain. (Patient not taking: Reported on 10/31/2022)      FLUoxetine (PROZAC) 20 mg capsule Take 20 mg by mouth daily. (Patient not taking: Reported on 10/31/2022)  3    fluticasone (FLONASE) 50 mcg/actuation nasal spray SHAKE LQ AND U 2 SPRAYS IEN QD (Patient not taking: Reported on 10/31/2022)  12       Allergies   Allergen Reactions    Chlorthalidone Other (comments)     Low Blood Pressure    Effexor [Venlafaxine] Other (comments)     High Blood Pressure      Keflex [Cephalexin] Itching         REVIEW OF SYSTEMS    Constitutional: Negative for fever, chills, or weight change. Respiratory: Negative for cough or shortness of breath. Cardiovascular: Negative for chest pain or palpitations.   Gastrointestinal: Negative for acid reflux, change in bowel habits, or constipation. Genitourinary: Negative for dysuria and flank pain. Musculoskeletal: Positive for lumbar and mid-back pain. Skin: Negative for rash. Neurological: Negative for headaches, dizziness, or numbness. Endo/Heme/Allergies: Negative for increased bruising. Psychiatric/Behavioral: Negative for difficulty with sleep. As per HPI    PHYSICAL EXAMINATION  Visit Vitals  Pulse 80   Temp 97.1 °F (36.2 °C) (Temporal)   Ht 6' (1.829 m)   Wt 258 lb (117 kg)   SpO2 96%   BMI 34.99 kg/m²       Constitutional: Awake, alert, and in no acute distress. Neurological: Sensation to light touch is intact. Negative Clemens's sign bilaterally. Skin: warm, dry, and intact. Musculoskeletal: Tenderness to palpation in between the shoulder blades. No pain with extension, axial loading, or forward flexion. No pain with internal or external rotation of his hips. Negative straight leg raise bilaterally. No tenderness to palpation in the lower lumbar region. Biceps  Triceps Deltoids Wrist Ext Wrist Flex Hand Intrin   Right +4/5 +4/5 +4/5 +4/5 +4/5 +4/5   Left +4/5 +4/5 +4/5 +4/5 +4/5 +4/5      Hip Flex  Quads Hamstrings Ankle DF EHL Ankle PF   Right +4/5 +4/5 +4/5 +4/5 +4/5 +4/5   Left +4/5 +4/5 +4/5 +4/5 +4/5 +4/5     IMAGING:    Lumbar 4V x-rays from 10/13/2020 were personally reviewed with the patient and demonstrated:  L5-S1 fusion. Hardware is intact. Severe degenerative disc at L4-5 with multilevel degenerative facet. No instability. Inflammation in the SI joints bilaterally. Written by Malone Hammans, as dictated by Víctor Salas MD.  I, Dr. Víctor Salas confirm that all documentation is accurate.

## 2022-10-31 ENCOUNTER — OFFICE VISIT (OUTPATIENT)
Dept: ORTHOPEDIC SURGERY | Age: 67
End: 2022-10-31
Payer: COMMERCIAL

## 2022-10-31 VITALS
BODY MASS INDEX: 34.95 KG/M2 | HEIGHT: 72 IN | HEART RATE: 80 BPM | TEMPERATURE: 97.1 F | WEIGHT: 258 LBS | OXYGEN SATURATION: 96 %

## 2022-10-31 DIAGNOSIS — M54.6 THORACIC SPINE PAIN: ICD-10-CM

## 2022-10-31 DIAGNOSIS — M53.3 SACROILIAC JOINT DISEASE: ICD-10-CM

## 2022-10-31 DIAGNOSIS — Z98.1 HISTORY OF LUMBAR FUSION: ICD-10-CM

## 2022-10-31 DIAGNOSIS — M47.816 LUMBAR FACET ARTHROPATHY: Primary | ICD-10-CM

## 2022-10-31 DIAGNOSIS — M54.50 LUMBAR PAIN: ICD-10-CM

## 2022-10-31 PROCEDURE — 1123F ACP DISCUSS/DSCN MKR DOCD: CPT | Performed by: PHYSICAL MEDICINE & REHABILITATION

## 2022-10-31 PROCEDURE — 99213 OFFICE O/P EST LOW 20 MIN: CPT | Performed by: PHYSICAL MEDICINE & REHABILITATION

## 2022-10-31 PROCEDURE — 3017F COLORECTAL CA SCREEN DOC REV: CPT | Performed by: PHYSICAL MEDICINE & REHABILITATION

## 2022-10-31 PROCEDURE — G8417 CALC BMI ABV UP PARAM F/U: HCPCS | Performed by: PHYSICAL MEDICINE & REHABILITATION

## 2022-10-31 PROCEDURE — G8536 NO DOC ELDER MAL SCRN: HCPCS | Performed by: PHYSICAL MEDICINE & REHABILITATION

## 2022-10-31 PROCEDURE — 1101F PT FALLS ASSESS-DOCD LE1/YR: CPT | Performed by: PHYSICAL MEDICINE & REHABILITATION

## 2022-10-31 PROCEDURE — G8432 DEP SCR NOT DOC, RNG: HCPCS | Performed by: PHYSICAL MEDICINE & REHABILITATION

## 2022-10-31 PROCEDURE — G8427 DOCREV CUR MEDS BY ELIG CLIN: HCPCS | Performed by: PHYSICAL MEDICINE & REHABILITATION

## 2022-10-31 RX ORDER — BUPROPION HYDROCHLORIDE 300 MG/1
TABLET ORAL
COMMUNITY
Start: 2022-10-25

## 2022-10-31 RX ORDER — BENZONATATE 100 MG/1
CAPSULE ORAL
COMMUNITY
Start: 2022-09-22

## 2022-10-31 RX ORDER — LOSARTAN POTASSIUM 25 MG/1
TABLET ORAL
COMMUNITY

## 2022-10-31 RX ORDER — METHYLPREDNISOLONE 4 MG/1
TABLET ORAL
Qty: 1 DOSE PACK | Refills: 0 | Status: SHIPPED | OUTPATIENT
Start: 2022-10-31

## 2022-10-31 RX ORDER — FAMOTIDINE 40 MG/1
TABLET, FILM COATED ORAL
COMMUNITY

## 2022-10-31 NOTE — PATIENT INSTRUCTIONS
Low Back Arthritis: Exercises  Introduction  Here are some examples of typical rehabilitation exercises for your condition. Start each exercise slowly. Ease off the exercise if you start to have pain. Your doctor or physical therapist will tell you when you can start these exercises and which ones will work best for you. When you are not being active, find a comfortable position for rest. Some people are comfortable on the floor or a medium-firm bed with a small pillow under their head and another under their knees. Some people prefer to lie on their side with a pillow between their knees. Don't stay in one position for too long. Take short walks (10 to 20 minutes) every 2 to 3 hours. Avoid slopes, hills, and stairs until you feel better. Walk only distances you can manage without pain, especially leg pain. How to do the exercises  Pelvic tilt  Lie on your back with your knees bent. \"Brace\" your stomach--tighten your muscles by pulling in and imagining your belly button moving toward your spine. Press your lower back into the floor. You should feel your hips and pelvis rock back. Hold for 6 seconds while breathing smoothly. Relax and allow your pelvis and hips to rock forward. Repeat 8 to 12 times. Back stretches  Get down on your hands and knees on the floor. Relax your head and allow it to droop. Round your back up toward the ceiling until you feel a nice stretch in your upper, middle, and lower back. Hold this stretch for as long as it feels comfortable, or about 15 to 30 seconds. Return to the starting position with a flat back while you are on your hands and knees. Let your back sway by pressing your stomach toward the floor. Lift your buttocks toward the ceiling. Hold this position for 15 to 30 seconds. Repeat 2 to 4 times. Follow-up care is a key part of your treatment and safety. Be sure to make and go to all appointments, and call your doctor if you are having problems.  It's also a good idea to know your test results and keep a list of the medicines you take. Current as of: March 9, 2022               Content Version: 13.4  © 2006-2022 [employer]. Care instructions adapted under license by [employer] (which disclaims liability or warranty for this information). If you have questions about a medical condition or this instruction, always ask your healthcare professional. [address] any warranty or liability for your use of this information. Sacroiliac Pain: Exercises  Introduction  Here are some examples of exercises for you to try. The exercises may be suggested for a condition or for rehabilitation. Start each exercise slowly. Ease off the exercises if you start to have pain. You will be told when to start these exercises and which ones will work best for you. How to do the exercises  Knee-to-chest stretch  Do not do the knee-to-chest exercise if it causes or increases back or leg pain. Lie on your back with your knees bent and your feet flat on the floor. You can put a small pillow under your head and neck if it is more comfortable. Grasp your hands under one knee and bring the knee to your chest, keeping the other foot flat on the floor. Keep your lower back pressed to the floor. Hold for at least 15 to 30 seconds. Relax and lower the knee to the starting position. Repeat with the other leg. Repeat 2 to 4 times with each leg. To get more stretch, keep your other leg flat on the floor while pulling your knee to your chest.  Bridging  Lie on your back with both knees bent. Your knees should be bent about 90 degrees. Tighten your belly muscles by pulling in your belly button toward your spine. Then push your feet into the floor, squeeze your buttocks, and lift your hips off the floor until your shoulders, hips, and knees are all in a straight line.   Hold for about 6 seconds as you continue to breathe normally, and then slowly lower your hips back down to the floor and rest for up to 10 seconds. Repeat 8 to 12 times. Hip extension  Get down on your hands and knees on the floor. Keeping your back and neck straight, lift one leg straight out behind you. When you lift your leg, keep your hips level. Don't let your back twist, and don't let your hip drop toward the floor. Hold for 6 seconds. Repeat 8 to 12 times with each leg. If you feel steady and strong when you do this exercise, you can make it more difficult. To do this, when you lift your leg, also lift the opposite arm straight out in front of you. For example, lift the left leg and the right arm at the same time. (This is sometimes called the \"bird dog exercise. \") Hold for 6 seconds, and repeat 8 to 12 times on each side. Clamshell  Lie on your side with a pillow under your head. Keep your feet and knees together and your knees bent. Raise your top knee, but keep your feet together. Do not let your hips roll back. Your legs should open up like a clamshell. Hold for 6 seconds. Slowly lower your knee back down. Rest for 10 seconds. Repeat 8 to 12 times. Switch to your other side and repeat steps 1 through 5. Hamstring wall stretch  Lie on your back in a doorway, with one leg through the open door. Slide your affected leg up the wall to straighten your knee. You should feel a gentle stretch down the back of your leg. Hold the stretch for at least 1 minute to begin. Then try to lengthen the time you hold the stretch to as long as 6 minutes. Switch legs, and repeat steps 1 through 3. Repeat 2 to 4 times. If you do not have a place to do this exercise in a doorway, there is another way to do it:  Lie on your back, and bend one knee. Loop a towel under the ball and toes of that foot, and hold the ends of the towel in your hands. Straighten your knee, and slowly pull back on the towel. You should feel a gentle stretch down the back of your leg.   Switch legs, and repeat steps 1 through 3. Repeat 2 to 4 times. Do not arch your back. Do not bend either knee. Keep one heel touching the floor and the other heel touching the wall. Do not point your toes. Lower abdominal strengthening  Lie on your back with your knees bent and your feet flat on the floor. Tighten your belly muscles by pulling your belly button in toward your spine. Lift one foot off the floor and bring your knee toward your chest, so that your knee is straight above your hip and your leg is bent like the letter \"L. \"  Lift the other knee up to the same position. Lower one leg at a time to the starting position. Keep alternating legs until you have lifted each leg 8 to 12 times. Be sure to keep your belly muscles tight and your back still as you are moving your legs. Be sure to breathe normally. Piriformis stretch  Lie on your back with your legs straight. Lift your affected leg, and bend your knee. With your opposite hand, reach across your body, and then gently pull your knee toward your opposite shoulder. Hold the stretch for 15 to 30 seconds. Switch legs and repeat steps 1 through 3. Repeat 2 to 4 times. Follow-up care is a key part of your treatment and safety. Be sure to make and go to all appointments, and call your doctor if you are having problems. It's also a good idea to know your test results and keep a list of the medicines you take. Current as of: March 9, 2022               Content Version: 13.4  © 2006-2022 [employer]. Care instructions adapted under license by [employer] (which disclaims liability or warranty for this information). If you have questions about a medical condition or this instruction, always ask your healthcare professional. [place] [address] any warranty or liability for your use of this information. Learning About the [address] Diet  What is the Mediterranean diet?      The Mediterranean diet is a style of eating rather than a diet plan. It features foods eaten in [place], [place], [place] and [place], and other countries along the [facility]. It emphasizes eating foods like fish, fruits, vegetables, beans, high-fiber breads and whole grains, nuts, and olive oil. This style of eating includes limited red meat, cheese, and sweets. Why choose the Mediterranean diet? A Mediterranean-style diet may improve heart health. It contains more fat than other heart-healthy diets. But the fats are mainly from nuts, unsaturated oils (such as fish oils and olive oil), and certain nut or seed oils (such as canola, soybean, or flaxseed oil). These fats may help protect the heart and blood vessels. How can you get started on the Mediterranean diet? Here are some things you can do to switch to a more Mediterranean way of eating. What to eat  Eat a variety of fruits and vegetables each day, such as grapes, blueberries, tomatoes, broccoli, peppers, figs, olives, spinach, eggplant, beans, lentils, and chickpeas. Eat a variety of whole-grain foods each day, such as oats, brown rice, and whole wheat bread, pasta, and couscous. Eat fish at least 2 times a week. Try tuna, salmon, mackerel, lake trout, herring, or sardines. Eat moderate amounts of low-fat dairy products, such as milk, cheese, or yogurt. Eat moderate amounts of poultry and eggs. Choose healthy (unsaturated) fats, such as nuts, olive oil, and certain nut or seed oils like canola, soybean, and flaxseed. Limit unhealthy (saturated) fats, such as butter, palm oil, and coconut oil. And limit fats found in animal products, such as meat and dairy products made with whole milk. Try to eat red meat only a few times a month in very small amounts. Limit sweets and desserts to only a few times a week. This includes sugar-sweetened drinks like soda.   The Mediterranean diet may also include red wine with your meal--1 glass each day for women and up to 2 glasses a day for men.  Tips for eating at home  Use herbs, spices, garlic, lemon zest, and citrus juice instead of salt to add flavor to foods. Add avocado slices to your sandwich instead of valadez. Have fish for lunch or dinner instead of red meat. Brush the fish with olive oil, and broil or grill it. Sprinkle your salad with seeds or nuts instead of cheese. Cook with olive or canola oil instead of butter or oils that are high in saturated fat. Switch from 2% milk or whole milk to 1% or fat-free milk. Dip raw vegetables in a vinaigrette dressing or hummus instead of dips made from mayonnaise or sour cream.  Have a piece of fruit for dessert instead of a piece of cake. Try baked apples, or have some dried fruit. Tips for eating out  Try broiled, grilled, baked, or poached fish instead of having it fried or breaded. Ask your  to have your meals prepared with olive oil instead of butter. Order dishes made with marinara sauce or sauces made from olive oil. Avoid sauces made from cream or mayonnaise. Choose whole-grain breads, whole wheat pasta and pizza crust, brown rice, beans, and lentils. Cut back on butter or margarine on bread. Instead, you can dip your bread in a small amount of olive oil. Ask for a side salad or grilled vegetables instead of french fries or chips. Where can you learn more? Go to http://www.tabares.com/  Enter O407 in the search box to learn more about \"Learning About the Mediterranean Diet. \"  Current as of: May 9, 2022               Content Version: 13.4  © 0698-9995 [employer]. Care instructions adapted under license by [employer] (which disclaims liability or warranty for this information). If you have questions about a medical condition or this instruction, always ask your healthcare professional. [address] any warranty or liability for your use of this information.

## 2022-10-31 NOTE — PROGRESS NOTES
Morenita Obregon presents today for   Chief Complaint   Patient presents with    Back Pain       Is someone accompanying this pt? no    Is the patient using any DME equipment during OV? no    Depression Screening:  3 most recent PHQ Screens 11/4/2020   Little interest or pleasure in doing things Not at all   Feeling down, depressed, irritable, or hopeless Not at all   Total Score PHQ 2 0       Learning Assessment:  Learning Assessment 11/4/2020   PRIMARY LEARNER Patient   PRIMARY LANGUAGE ENGLISH   LEARNER PREFERENCE PRIMARY DEMONSTRATION   ANSWERED BY john   RELATIONSHIP SELF       Abuse Screening:  Abuse Screening Questionnaire 11/4/2020   Do you ever feel afraid of your partner? N   Are you in a relationship with someone who physically or mentally threatens you? N   Is it safe for you to go home? Y       Fall Risk  Fall Risk Assessment, last 12 mths 11/4/2020   Able to walk? Yes   Fall in past 12 months? No       OPIOID RISK TOOL  No flowsheet data found. Coordination of Care:  1. Have you been to the ER, urgent care clinic since your last visit? no  Hospitalized since your last visit? no    2. Have you seen or consulted any other health care providers outside of the [address] [place] [address] since your last visit? no Include any pap smears or colon screening.  no

## 2022-11-07 ENCOUNTER — HOSPITAL ENCOUNTER (OUTPATIENT)
Dept: GENERAL RADIOLOGY | Age: 67
Discharge: HOME | End: 2022-11-07
Payer: MEDICARE

## 2022-11-07 DIAGNOSIS — Z77.090 CONTACT WITH AND (SUSPECTED) EXPOSURE TO ASBESTOS: ICD-10-CM

## 2022-11-07 DIAGNOSIS — R05.9 COUGH: ICD-10-CM

## 2022-11-07 DIAGNOSIS — J44.9 CHRONIC OBSTRUCTIVE BRONCHITIS (HCC): ICD-10-CM

## 2022-11-07 PROCEDURE — 71046 X-RAY EXAM CHEST 2 VIEWS: CPT

## 2023-04-24 ENCOUNTER — OFFICE VISIT (OUTPATIENT)
Age: 68
End: 2023-04-24
Payer: MEDICARE

## 2023-04-24 VITALS
OXYGEN SATURATION: 92 % | WEIGHT: 255 LBS | RESPIRATION RATE: 16 BRPM | BODY MASS INDEX: 34.54 KG/M2 | HEIGHT: 72 IN | TEMPERATURE: 97 F | HEART RATE: 87 BPM

## 2023-04-24 DIAGNOSIS — M53.3 SACROCOCCYGEAL DISORDERS, NOT ELSEWHERE CLASSIFIED: ICD-10-CM

## 2023-04-24 DIAGNOSIS — M54.50 CHRONIC MIDLINE LOW BACK PAIN WITHOUT SCIATICA: ICD-10-CM

## 2023-04-24 DIAGNOSIS — G89.29 CHRONIC MIDLINE LOW BACK PAIN WITHOUT SCIATICA: ICD-10-CM

## 2023-04-24 DIAGNOSIS — M47.816 SPONDYLOSIS WITHOUT MYELOPATHY OR RADICULOPATHY, LUMBAR REGION: Primary | ICD-10-CM

## 2023-04-24 PROCEDURE — 1123F ACP DISCUSS/DSCN MKR DOCD: CPT | Performed by: PHYSICAL MEDICINE & REHABILITATION

## 2023-04-24 PROCEDURE — G8417 CALC BMI ABV UP PARAM F/U: HCPCS | Performed by: PHYSICAL MEDICINE & REHABILITATION

## 2023-04-24 PROCEDURE — 1036F TOBACCO NON-USER: CPT | Performed by: PHYSICAL MEDICINE & REHABILITATION

## 2023-04-24 PROCEDURE — 99213 OFFICE O/P EST LOW 20 MIN: CPT | Performed by: PHYSICAL MEDICINE & REHABILITATION

## 2023-04-24 PROCEDURE — G8427 DOCREV CUR MEDS BY ELIG CLIN: HCPCS | Performed by: PHYSICAL MEDICINE & REHABILITATION

## 2023-04-24 PROCEDURE — 3017F COLORECTAL CA SCREEN DOC REV: CPT | Performed by: PHYSICAL MEDICINE & REHABILITATION

## 2023-04-24 ASSESSMENT — PATIENT HEALTH QUESTIONNAIRE - PHQ9
2. FEELING DOWN, DEPRESSED OR HOPELESS: 0
SUM OF ALL RESPONSES TO PHQ QUESTIONS 1-9: 0
SUM OF ALL RESPONSES TO PHQ9 QUESTIONS 1 & 2: 0
SUM OF ALL RESPONSES TO PHQ QUESTIONS 1-9: 0
SUM OF ALL RESPONSES TO PHQ QUESTIONS 1-9: 0
1. LITTLE INTEREST OR PLEASURE IN DOING THINGS: 0
SUM OF ALL RESPONSES TO PHQ QUESTIONS 1-9: 0

## 2023-04-24 NOTE — PROGRESS NOTES
Chief Complaint   Patient presents with    Follow-up       Pt preferred language for health care discussion is english. Is someone accompanying this pt? no    Is the patient using any DME equipment during OV? no    Depression Screening:  No flowsheet data found. Learning Assessment:  No flowsheet data found. Abuse Screening:  No flowsheet data found. Fall Risk  No flowsheet data found. Advance Directive:  1. Do you have an advance directive in place? Patient Reply:no    2. If not, would you like material regarding how to put one in place? Patient Reply: no    2. Per patient no changes to their ACP contact no. Coordination of Care:  1. Have you been to the ER, urgent care clinic since your last visit? Hospitalized since your last visit? no    2. Have you seen or consulted any other health care providers outside of the [address] [place] [address] since your last visit? Include any pap smears or colon screening.  no

## 2023-04-24 NOTE — PROGRESS NOTES
[facility]  [address] [place], [address]  Phone: [phone]  Fax: [phone]    Pt's YOB: 1955    ASSESSMENT   Shandra Jorgensen was seen today for follow-up. Diagnoses and all orders for this visit:    Spondylosis without myelopathy or radiculopathy, lumbar region    Sacrococcygeal disorders, not elsewhere classified    Chronic midline low back pain without sciatica         IMPRESSION AND PLAN:  Peggy Setting is a 80 y.o. male with history of chronic lumbar pain and presents to the office today for medication follow up. Pt endorses improvement of his lumbar pain since his last office visit. He reports relief in his severe pain episodes within hours of taking a Medrol Dosepak. 1) Pt was given information on low back arthritis and sacroiliac exercises. 2) Lumbar spine 4V XR's from 10/13/2020 were reviewed with the pt at today's office visit. 3) He will begin taking previously prescribed Medrol Dosepak if episode of acute inflammatory pain occurs. 4) Mr. Lucien Real has a reminder for a \"due or due soon\" health maintenance. I have asked that he contact his primary care provider, [facility], , for follow-up on this health maintenance. 5)  demonstrated consistency with prescribing. Return if symptoms worsen or fail to improve. HISTORY OF PRESENT ILLNESS:  Peggy Setting is a 80 y.o. male with history of chronic lumbar pain and presents to the office today for medication follow up. Pt endorses improvement of his lumbar pain (L>R) since his last office visit. He notes having previously been prescribed a Medrol Dosepak and has not had to use this. Pt reports relief of his symptoms with increased activity. Of note, he had 2 previous surgeries with Dr. Maria M Munoz and last had surgery in 2003. He mentions exacerbation of his symptoms when driving motor vehicle and motorcycle for prolonged periods of time.  Pt describes

## 2025-04-09 ENCOUNTER — HOSPITAL ENCOUNTER (OUTPATIENT)
Facility: HOSPITAL | Age: 70
Discharge: HOME | End: 2025-04-12
Payer: MEDICARE

## 2025-04-09 DIAGNOSIS — J44.9 CHRONIC OBSTRUCTIVE PULMONARY DISEASE, UNSPECIFIED COPD TYPE (HCC): ICD-10-CM

## 2025-04-09 PROCEDURE — 71046 X-RAY EXAM CHEST 2 VIEWS: CPT
